# Patient Record
Sex: MALE | Race: WHITE | NOT HISPANIC OR LATINO | Employment: FULL TIME | URBAN - METROPOLITAN AREA
[De-identification: names, ages, dates, MRNs, and addresses within clinical notes are randomized per-mention and may not be internally consistent; named-entity substitution may affect disease eponyms.]

---

## 2023-03-24 PROBLEM — L23.7 POISON IVY: Status: ACTIVE | Noted: 2023-03-24

## 2023-03-24 PROBLEM — M25.552 LEFT HIP PAIN: Status: ACTIVE | Noted: 2023-03-24

## 2023-03-24 PROBLEM — K82.9 GALLBLADDER PAIN: Status: ACTIVE | Noted: 2023-03-24

## 2023-03-24 PROBLEM — M25.642 STIFFNESS OF FINGER JOINT OF LEFT HAND: Status: ACTIVE | Noted: 2023-03-24

## 2023-03-24 PROBLEM — I10 HYPERTENSION: Status: ACTIVE | Noted: 2023-03-24

## 2023-03-24 PROBLEM — R74.8 ELEVATED LIVER ENZYMES: Status: ACTIVE | Noted: 2023-03-24

## 2023-03-24 PROBLEM — M22.2X2 PATELLOFEMORAL PAIN SYNDROME OF LEFT KNEE: Status: ACTIVE | Noted: 2023-03-24

## 2023-03-24 PROBLEM — S61.211A LACERATION OF LEFT INDEX FINGER WITHOUT FOREIGN BODY WITHOUT DAMAGE TO NAIL: Status: ACTIVE | Noted: 2023-03-24

## 2023-03-24 PROBLEM — M62.838 NECK MUSCLE SPASM: Status: ACTIVE | Noted: 2023-03-24

## 2023-03-24 PROBLEM — R11.0 NAUSEA IN ADULT: Status: ACTIVE | Noted: 2023-03-24

## 2023-03-24 PROBLEM — A64 STD (MALE): Status: ACTIVE | Noted: 2023-03-24

## 2023-03-24 PROBLEM — M25.551 RIGHT HIP PAIN: Status: ACTIVE | Noted: 2023-03-24

## 2023-03-24 PROBLEM — M25.562 BILATERAL KNEE PAIN: Status: ACTIVE | Noted: 2023-03-24

## 2023-03-24 PROBLEM — S05.02XA LEFT CORNEAL ABRASION: Status: ACTIVE | Noted: 2023-03-24

## 2023-03-24 PROBLEM — G43.909 MIGRAINES: Status: ACTIVE | Noted: 2023-03-24

## 2023-03-24 PROBLEM — L02.92 BOIL: Status: ACTIVE | Noted: 2023-03-24

## 2023-03-24 PROBLEM — R05.9 COUGH: Status: ACTIVE | Noted: 2023-03-24

## 2023-03-24 PROBLEM — L30.9 DERMATITIS: Status: ACTIVE | Noted: 2023-03-24

## 2023-03-24 PROBLEM — B18.2 CHRONIC VIRAL HEPATITIS C (MULTI): Status: ACTIVE | Noted: 2023-03-24

## 2023-03-24 PROBLEM — J11.1 FLU: Status: ACTIVE | Noted: 2023-03-24

## 2023-03-24 PROBLEM — B18.1 CHRONIC HEPATITIS B (MULTI): Status: ACTIVE | Noted: 2023-03-24

## 2023-03-24 PROBLEM — R21 RASH: Status: ACTIVE | Noted: 2023-03-24

## 2023-03-24 PROBLEM — S05.90XA EYE INJURY, INITIAL ENCOUNTER: Status: ACTIVE | Noted: 2023-03-24

## 2023-03-24 PROBLEM — N20.0 KIDNEY STONES: Status: ACTIVE | Noted: 2023-03-24

## 2023-03-24 PROBLEM — N39.0 UTI (URINARY TRACT INFECTION): Status: ACTIVE | Noted: 2023-03-24

## 2023-03-24 PROBLEM — M25.561 BILATERAL KNEE PAIN: Status: ACTIVE | Noted: 2023-03-24

## 2023-03-24 PROBLEM — M79.646 FINGER PAIN: Status: ACTIVE | Noted: 2023-03-24

## 2023-03-24 PROBLEM — I25.10 CORONARY ARTERY DISEASE: Status: ACTIVE | Noted: 2023-03-24

## 2023-03-24 PROBLEM — J20.9 ACUTE BRONCHITIS: Status: ACTIVE | Noted: 2023-03-24

## 2023-03-24 PROBLEM — M79.643 HAND PAIN: Status: ACTIVE | Noted: 2023-03-24

## 2023-03-24 RX ORDER — HYDROCORTISONE 25 MG/G
CREAM TOPICAL DAILY PRN
COMMUNITY

## 2023-03-24 RX ORDER — VIT C/E/ZN/COPPR/LUTEIN/ZEAXAN 250MG-90MG
1 CAPSULE ORAL DAILY
COMMUNITY
Start: 2020-01-06 | End: 2023-04-17 | Stop reason: SDUPTHER

## 2023-03-24 RX ORDER — AMLODIPINE BESYLATE 10 MG/1
1 TABLET ORAL DAILY
COMMUNITY
Start: 2019-11-22 | End: 2023-04-17 | Stop reason: SDUPTHER

## 2023-03-24 RX ORDER — CETIRIZINE HYDROCHLORIDE 10 MG/1
10 TABLET ORAL DAILY
COMMUNITY

## 2023-03-27 RX ORDER — BUSPIRONE HYDROCHLORIDE 30 MG/1
TABLET ORAL
COMMUNITY

## 2023-03-27 RX ORDER — ASPIRIN 325 MG
TABLET, DELAYED RELEASE (ENTERIC COATED) ORAL
COMMUNITY
End: 2023-07-11 | Stop reason: SDUPTHER

## 2023-03-27 RX ORDER — AMPHETAMINE SULFATE 10 MG/1
TABLET ORAL 2 TIMES DAILY
COMMUNITY

## 2023-03-31 ENCOUNTER — APPOINTMENT (OUTPATIENT)
Dept: PRIMARY CARE | Facility: CLINIC | Age: 33
End: 2023-03-31
Payer: MEDICAID

## 2023-04-05 LAB — THYROTROPIN (MIU/L) IN SER/PLAS BY DETECTION LIMIT <= 0.05 MIU/L: 2.51 MIU/L (ref 0.44–3.98)

## 2023-04-06 ENCOUNTER — OFFICE VISIT (OUTPATIENT)
Dept: PRIMARY CARE | Facility: CLINIC | Age: 33
End: 2023-04-06
Payer: COMMERCIAL

## 2023-04-06 VITALS
DIASTOLIC BLOOD PRESSURE: 62 MMHG | HEIGHT: 70 IN | BODY MASS INDEX: 24.2 KG/M2 | WEIGHT: 169 LBS | SYSTOLIC BLOOD PRESSURE: 140 MMHG

## 2023-04-06 DIAGNOSIS — M15.3 POST-TRAUMATIC OSTEOARTHRITIS OF MULTIPLE JOINTS: ICD-10-CM

## 2023-04-06 DIAGNOSIS — E03.4 HYPOTHYROIDISM DUE TO ACQUIRED ATROPHY OF THYROID: Primary | ICD-10-CM

## 2023-04-06 PROCEDURE — 3078F DIAST BP <80 MM HG: CPT | Performed by: INTERNAL MEDICINE

## 2023-04-06 PROCEDURE — 99213 OFFICE O/P EST LOW 20 MIN: CPT | Performed by: INTERNAL MEDICINE

## 2023-04-06 PROCEDURE — 3077F SYST BP >= 140 MM HG: CPT | Performed by: INTERNAL MEDICINE

## 2023-04-06 RX ORDER — QUETIAPINE FUMARATE 50 MG/1
50 TABLET, FILM COATED ORAL NIGHTLY
COMMUNITY
Start: 2023-03-23

## 2023-04-06 RX ORDER — LEVOTHYROXINE SODIUM 75 UG/1
75 TABLET ORAL DAILY
COMMUNITY
End: 2023-05-05

## 2023-04-06 NOTE — PROGRESS NOTES
"OFFICE NOTE    NAME OF THE PATIENT: Amos Small    YOB: 1990    CHIEF COMPLAINT:  This gentleman today came here for feeling tired, fatigued, exhausted, not feeling good.  He is taking thyroid medications now.  Feeling now.  No problem.  He is seeing psychiatrist He came for follow-up.    PAST MEDICAL HISTORY:  Reviewed on EMR, unchanged.    CURRENT MEDICATIONS:  Reviewed on EMR, unchanged.  List reviewed.    ALLERGIES:  Reviewed on EMR, unchanged.    SOCIAL HISTORY:  Reviewed on EMR, unchanged.  He does not smoke.    FAMILY HISTORY:  Reviewed on EMR, unchanged.    REVIEW OF SYSTEMS:  All 12 systems reviewed and pertaining covered in history and physical.    PHYSICAL EXAMINATION  VITAL SIGNS:  As recorded and reviewed from EMR.  RESPIRATORY:  The patient had normal inspirations and expirations.  The breath sounds were equal bilaterally and clear to auscultation.  CARDIOVASCULAR:  The patient had S1 normal, split S2 without obvious rubs, clicks, or murmurs.    GASTROINTESTINAL:  There was no hepatosplenomegaly.  There were no palpable masses and no inguinal nodes.  EXTREMITIES:  Legs had no edema.  NEUROLOGIC:  The patient had normal cranial nerves.  The reflexes, sensory, and motor examination were grossly within normal limits.    LAB WORK:  Laboratory testing discussed.    ASSESSMENT AND PLAN:  Hypertension, okay.  Allergy, stable.  Anxiety and depression, okay.  Hypothyroid.  Same dose of thyroid.  Follow-up in three months with repeat tests.  Generalized body ache, muscle pain, could be fibromyalgia.  Monitor.  Follow-up appointment with me in a couple of months.  Continue to follow.    Kindly review this note in conjunction with EMR.     Subjective   Patient ID: Amos Small is a 32 y.o. male who presents for Follow-up (results).      HPI    Review of Systems    Objective   /62   Ht 1.778 m (5' 10\")   Wt 76.7 kg (169 lb)   BMI 24.25 kg/m²       Physical Exam    Assessment/Plan "   Problem List Items Addressed This Visit    None

## 2023-04-17 DIAGNOSIS — I10 PRIMARY HYPERTENSION: ICD-10-CM

## 2023-04-17 DIAGNOSIS — E55.9 VITAMIN D DEFICIENCY: ICD-10-CM

## 2023-04-17 RX ORDER — VIT C/E/ZN/COPPR/LUTEIN/ZEAXAN 250MG-90MG
25 CAPSULE ORAL DAILY
Qty: 30 CAPSULE | Refills: 0 | Status: SHIPPED | OUTPATIENT
Start: 2023-04-17 | End: 2023-07-11 | Stop reason: SDUPTHER

## 2023-04-17 RX ORDER — AMLODIPINE BESYLATE 10 MG/1
10 TABLET ORAL DAILY
Qty: 30 TABLET | Refills: 0 | Status: SHIPPED | OUTPATIENT
Start: 2023-04-17 | End: 2023-07-12

## 2023-05-05 DIAGNOSIS — I10 ESSENTIAL (PRIMARY) HYPERTENSION: ICD-10-CM

## 2023-05-05 RX ORDER — LEVOTHYROXINE SODIUM 75 UG/1
TABLET ORAL
Qty: 90 TABLET | Refills: 1 | Status: SHIPPED | OUTPATIENT
Start: 2023-05-05 | End: 2023-10-16 | Stop reason: SDUPTHER

## 2023-06-23 ENCOUNTER — APPOINTMENT (OUTPATIENT)
Dept: LAB | Facility: LAB | Age: 33
End: 2023-06-23
Payer: MEDICAID

## 2023-07-03 ENCOUNTER — APPOINTMENT (OUTPATIENT)
Dept: PRIMARY CARE | Facility: CLINIC | Age: 33
End: 2023-07-03
Payer: MEDICAID

## 2023-07-10 ENCOUNTER — LAB (OUTPATIENT)
Dept: LAB | Facility: LAB | Age: 33
End: 2023-07-10
Payer: MEDICAID

## 2023-07-10 ENCOUNTER — APPOINTMENT (OUTPATIENT)
Dept: PRIMARY CARE | Facility: CLINIC | Age: 33
End: 2023-07-10
Payer: MEDICAID

## 2023-07-10 DIAGNOSIS — M15.3 POST-TRAUMATIC OSTEOARTHRITIS OF MULTIPLE JOINTS: ICD-10-CM

## 2023-07-10 DIAGNOSIS — E03.4 HYPOTHYROIDISM DUE TO ACQUIRED ATROPHY OF THYROID: ICD-10-CM

## 2023-07-10 LAB
ALANINE AMINOTRANSFERASE (SGPT) (U/L) IN SER/PLAS: 29 U/L (ref 10–52)
ALBUMIN (G/DL) IN SER/PLAS: 5.3 G/DL (ref 3.4–5)
ALKALINE PHOSPHATASE (U/L) IN SER/PLAS: 58 U/L (ref 33–120)
ANION GAP IN SER/PLAS: 14 MMOL/L (ref 10–20)
ASPARTATE AMINOTRANSFERASE (SGOT) (U/L) IN SER/PLAS: 31 U/L (ref 9–39)
BASOPHILS (10*3/UL) IN BLOOD BY AUTOMATED COUNT: 0.04 X10E9/L (ref 0–0.1)
BASOPHILS/100 LEUKOCYTES IN BLOOD BY AUTOMATED COUNT: 0.6 % (ref 0–2)
BILIRUBIN TOTAL (MG/DL) IN SER/PLAS: 0.9 MG/DL (ref 0–1.2)
CALCIUM (MG/DL) IN SER/PLAS: 10.7 MG/DL (ref 8.6–10.6)
CARBON DIOXIDE, TOTAL (MMOL/L) IN SER/PLAS: 33 MMOL/L (ref 21–32)
CHLORIDE (MMOL/L) IN SER/PLAS: 99 MMOL/L (ref 98–107)
CREATININE (MG/DL) IN SER/PLAS: 1.02 MG/DL (ref 0.5–1.3)
EOSINOPHILS (10*3/UL) IN BLOOD BY AUTOMATED COUNT: 0.19 X10E9/L (ref 0–0.7)
EOSINOPHILS/100 LEUKOCYTES IN BLOOD BY AUTOMATED COUNT: 2.8 % (ref 0–6)
ERYTHROCYTE DISTRIBUTION WIDTH (RATIO) BY AUTOMATED COUNT: 12.1 % (ref 11.5–14.5)
ERYTHROCYTE MEAN CORPUSCULAR HEMOGLOBIN CONCENTRATION (G/DL) BY AUTOMATED: 33.5 G/DL (ref 32–36)
ERYTHROCYTE MEAN CORPUSCULAR VOLUME (FL) BY AUTOMATED COUNT: 91 FL (ref 80–100)
ERYTHROCYTES (10*6/UL) IN BLOOD BY AUTOMATED COUNT: 5.3 X10E12/L (ref 4.5–5.9)
GFR MALE: >90 ML/MIN/1.73M2
GLUCOSE (MG/DL) IN SER/PLAS: 107 MG/DL (ref 74–99)
HEMATOCRIT (%) IN BLOOD BY AUTOMATED COUNT: 48.1 % (ref 41–52)
HEMOGLOBIN (G/DL) IN BLOOD: 16.1 G/DL (ref 13.5–17.5)
IMMATURE GRANULOCYTES/100 LEUKOCYTES IN BLOOD BY AUTOMATED COUNT: 0.3 % (ref 0–0.9)
LEUKOCYTES (10*3/UL) IN BLOOD BY AUTOMATED COUNT: 6.8 X10E9/L (ref 4.4–11.3)
LYMPHOCYTES (10*3/UL) IN BLOOD BY AUTOMATED COUNT: 2.98 X10E9/L (ref 1.2–4.8)
LYMPHOCYTES/100 LEUKOCYTES IN BLOOD BY AUTOMATED COUNT: 43.6 % (ref 13–44)
MONOCYTES (10*3/UL) IN BLOOD BY AUTOMATED COUNT: 0.63 X10E9/L (ref 0.1–1)
MONOCYTES/100 LEUKOCYTES IN BLOOD BY AUTOMATED COUNT: 9.2 % (ref 2–10)
NEUTROPHILS (10*3/UL) IN BLOOD BY AUTOMATED COUNT: 2.97 X10E9/L (ref 1.2–7.7)
NEUTROPHILS/100 LEUKOCYTES IN BLOOD BY AUTOMATED COUNT: 43.5 % (ref 40–80)
NRBC (PER 100 WBCS) BY AUTOMATED COUNT: 0 /100 WBC (ref 0–0)
PLATELETS (10*3/UL) IN BLOOD AUTOMATED COUNT: 281 X10E9/L (ref 150–450)
POTASSIUM (MMOL/L) IN SER/PLAS: 4.5 MMOL/L (ref 3.5–5.3)
PROTEIN TOTAL: 8.1 G/DL (ref 6.4–8.2)
SODIUM (MMOL/L) IN SER/PLAS: 141 MMOL/L (ref 136–145)
THYROTROPIN (MIU/L) IN SER/PLAS BY DETECTION LIMIT <= 0.05 MIU/L: 2.51 MIU/L (ref 0.44–3.98)
UREA NITROGEN (MG/DL) IN SER/PLAS: 15 MG/DL (ref 6–23)

## 2023-07-10 PROCEDURE — 84443 ASSAY THYROID STIM HORMONE: CPT

## 2023-07-10 PROCEDURE — 36415 COLL VENOUS BLD VENIPUNCTURE: CPT

## 2023-07-10 PROCEDURE — 85025 COMPLETE CBC W/AUTO DIFF WBC: CPT

## 2023-07-10 PROCEDURE — 80053 COMPREHEN METABOLIC PANEL: CPT

## 2023-07-11 ENCOUNTER — OFFICE VISIT (OUTPATIENT)
Dept: PRIMARY CARE | Facility: CLINIC | Age: 33
End: 2023-07-11
Payer: COMMERCIAL

## 2023-07-11 VITALS
HEIGHT: 70 IN | BODY MASS INDEX: 23.22 KG/M2 | DIASTOLIC BLOOD PRESSURE: 76 MMHG | SYSTOLIC BLOOD PRESSURE: 124 MMHG | WEIGHT: 162.2 LBS

## 2023-07-11 DIAGNOSIS — R74.8 ELEVATED LIVER ENZYMES: ICD-10-CM

## 2023-07-11 DIAGNOSIS — I10 PRIMARY HYPERTENSION: ICD-10-CM

## 2023-07-11 DIAGNOSIS — E03.4 HYPOTHYROIDISM DUE TO ACQUIRED ATROPHY OF THYROID: ICD-10-CM

## 2023-07-11 DIAGNOSIS — E55.9 VITAMIN D DEFICIENCY: ICD-10-CM

## 2023-07-11 PROCEDURE — 3074F SYST BP LT 130 MM HG: CPT | Performed by: INTERNAL MEDICINE

## 2023-07-11 PROCEDURE — 3078F DIAST BP <80 MM HG: CPT | Performed by: INTERNAL MEDICINE

## 2023-07-11 PROCEDURE — 99214 OFFICE O/P EST MOD 30 MIN: CPT | Performed by: INTERNAL MEDICINE

## 2023-07-11 RX ORDER — VIT C/E/ZN/COPPR/LUTEIN/ZEAXAN 250MG-90MG
25 CAPSULE ORAL DAILY
Qty: 30 CAPSULE | Refills: 3 | Status: SHIPPED | OUTPATIENT
Start: 2023-07-11 | End: 2023-11-28

## 2023-07-11 NOTE — PROGRESS NOTES
OFFICE NOTE    NAME OF THE PATIENT: Amos Small    YOB: 1990    CHIEF COMPLAINT:  Amos Small today came here for multiple medical issues.   Overall, he is a happy  person.  Appetite and weight are okay.  No chest pain.  Taking medications regularly.  He is a man of good habits.  He exercises regularly.  He is drinking enough water.    PAST MEDICAL HISTORY:  Reviewed on EMR, unchanged.    CURRENT MEDICATIONS:  Reviewed on EMR, unchanged.  Diclofenac sodium gel, buspirone, omeprazole, vitamin D, ibuprofen, ______, amlodipine, levothyroxine, and amphetamine.  He sees psychiatrist.    ALLERGIES:  Reviewed on EMR, unchanged.    SOCIAL HISTORY:  Reviewed on EMR, unchanged.  He does not smoke and does not drink alcohol.    FAMILY HISTORY:  Reviewed on EMR, unchanged.    REVIEW OF SYSTEMS:  All 12 systems reviewed and pertaining covered in history and physical.    PHYSICAL EXAMINATION  VITAL SIGNS:  As recorded and reviewed from EMR.  RESPIRATORY:  The patient had normal inspirations and expirations.  The breath sounds were equal bilaterally and clear to auscultation.  CARDIOVASCULAR:  The patient had S1 normal, split S2 without obvious rubs, clicks, or murmurs.    GASTROINTESTINAL:  There was no hepatosplenomegaly.  There were no palpable masses and no inguinal nodes.  EXTREMITIES:  Legs had no edema.  NEUROLOGIC:  The patient had normal cranial nerves.  The reflexes, sensory, and motor examination were grossly within normal limits.    LAB WORK:  Laboratory testing discussed.    ASSESSMENT AND PLAN:  Hypercalcemia, borderline.  Drink enough water.  Repeat test in a month.  Osteoarthritis, on medication.  GERD, on PPI.  Anxiety and depression, on medication.  Hypothyroid, on levothyroxine.  Blood work ordered.  I urged him to see me in a month with repeat test.  I reassured him.  Calcium is borderline.  I will keep an eye.      Kindly review this note in conjunction with EMR.     Subjective   Patient ID:  "Amos Small is a 33 y.o. male who presents for Follow-up.      HPI    Review of Systems    Objective   /76   Ht 1.778 m (5' 10\")   Wt 73.6 kg (162 lb 3.2 oz)   BMI 23.27 kg/m²       Physical Exam    Assessment/Plan   Problem List Items Addressed This Visit    None  Visit Diagnoses       Vitamin D deficiency                  "

## 2023-07-12 DIAGNOSIS — I10 PRIMARY HYPERTENSION: ICD-10-CM

## 2023-07-12 RX ORDER — AMLODIPINE BESYLATE 10 MG/1
TABLET ORAL
Qty: 90 TABLET | Refills: 1 | Status: SHIPPED | OUTPATIENT
Start: 2023-07-12 | End: 2024-01-23

## 2023-08-01 DIAGNOSIS — M25.562 PAIN IN BOTH KNEES, UNSPECIFIED CHRONICITY: ICD-10-CM

## 2023-08-01 DIAGNOSIS — M25.561 PAIN IN BOTH KNEES, UNSPECIFIED CHRONICITY: ICD-10-CM

## 2023-08-01 RX ORDER — IBUPROFEN 600 MG/1
600 TABLET ORAL
Qty: 90 TABLET | Refills: 0 | Status: SHIPPED | OUTPATIENT
Start: 2023-08-01 | End: 2023-08-31

## 2023-08-01 RX ORDER — IBUPROFEN 600 MG/1
1 TABLET ORAL
COMMUNITY
Start: 2023-04-07 | End: 2023-08-01 | Stop reason: SDUPTHER

## 2023-08-09 ENCOUNTER — LAB (OUTPATIENT)
Dept: LAB | Facility: LAB | Age: 33
End: 2023-08-09
Payer: COMMERCIAL

## 2023-08-09 DIAGNOSIS — I10 PRIMARY HYPERTENSION: ICD-10-CM

## 2023-08-09 DIAGNOSIS — R74.8 ELEVATED LIVER ENZYMES: ICD-10-CM

## 2023-08-09 DIAGNOSIS — E03.4 HYPOTHYROIDISM DUE TO ACQUIRED ATROPHY OF THYROID: ICD-10-CM

## 2023-08-09 LAB
ALANINE AMINOTRANSFERASE (SGPT) (U/L) IN SER/PLAS: 25 U/L (ref 10–52)
ALBUMIN (G/DL) IN SER/PLAS: 4.9 G/DL (ref 3.4–5)
ALKALINE PHOSPHATASE (U/L) IN SER/PLAS: 51 U/L (ref 33–120)
ANION GAP IN SER/PLAS: 14 MMOL/L (ref 10–20)
ASPARTATE AMINOTRANSFERASE (SGOT) (U/L) IN SER/PLAS: 23 U/L (ref 9–39)
BILIRUBIN TOTAL (MG/DL) IN SER/PLAS: 0.7 MG/DL (ref 0–1.2)
CALCIUM (MG/DL) IN SER/PLAS: 9.9 MG/DL (ref 8.6–10.6)
CARBON DIOXIDE, TOTAL (MMOL/L) IN SER/PLAS: 30 MMOL/L (ref 21–32)
CHLORIDE (MMOL/L) IN SER/PLAS: 101 MMOL/L (ref 98–107)
CREATININE (MG/DL) IN SER/PLAS: 0.99 MG/DL (ref 0.5–1.3)
ERYTHROCYTE DISTRIBUTION WIDTH (RATIO) BY AUTOMATED COUNT: 12 % (ref 11.5–14.5)
ERYTHROCYTE MEAN CORPUSCULAR HEMOGLOBIN CONCENTRATION (G/DL) BY AUTOMATED: 33.6 G/DL (ref 32–36)
ERYTHROCYTE MEAN CORPUSCULAR VOLUME (FL) BY AUTOMATED COUNT: 92 FL (ref 80–100)
ERYTHROCYTES (10*6/UL) IN BLOOD BY AUTOMATED COUNT: 4.83 X10E12/L (ref 4.5–5.9)
GFR MALE: >90 ML/MIN/1.73M2
GLUCOSE (MG/DL) IN SER/PLAS: 93 MG/DL (ref 74–99)
HEMATOCRIT (%) IN BLOOD BY AUTOMATED COUNT: 44.3 % (ref 41–52)
HEMOGLOBIN (G/DL) IN BLOOD: 14.9 G/DL (ref 13.5–17.5)
LEUKOCYTES (10*3/UL) IN BLOOD BY AUTOMATED COUNT: 5.1 X10E9/L (ref 4.4–11.3)
NRBC (PER 100 WBCS) BY AUTOMATED COUNT: 0 /100 WBC (ref 0–0)
PARATHYRIN INTACT (PG/ML) IN SER/PLAS: 37.4 PG/ML (ref 18.5–88)
PLATELETS (10*3/UL) IN BLOOD AUTOMATED COUNT: 253 X10E9/L (ref 150–450)
POC CALCIUM IONIZED (MMOL/L) IN BLOOD: 1.23 MMOL/L (ref 1.1–1.33)
POTASSIUM (MMOL/L) IN SER/PLAS: 4.1 MMOL/L (ref 3.5–5.3)
PROTEIN TOTAL: 7.5 G/DL (ref 6.4–8.2)
SODIUM (MMOL/L) IN SER/PLAS: 141 MMOL/L (ref 136–145)
THYROTROPIN (MIU/L) IN SER/PLAS BY DETECTION LIMIT <= 0.05 MIU/L: 1.64 MIU/L (ref 0.44–3.98)
UREA NITROGEN (MG/DL) IN SER/PLAS: 20 MG/DL (ref 6–23)

## 2023-08-09 PROCEDURE — 80053 COMPREHEN METABOLIC PANEL: CPT

## 2023-08-09 PROCEDURE — 85027 COMPLETE CBC AUTOMATED: CPT

## 2023-08-09 PROCEDURE — 83970 ASSAY OF PARATHORMONE: CPT

## 2023-08-09 PROCEDURE — 36415 COLL VENOUS BLD VENIPUNCTURE: CPT

## 2023-08-09 PROCEDURE — 84443 ASSAY THYROID STIM HORMONE: CPT

## 2023-08-09 PROCEDURE — 82330 ASSAY OF CALCIUM: CPT

## 2023-08-11 ENCOUNTER — OFFICE VISIT (OUTPATIENT)
Dept: PRIMARY CARE | Facility: CLINIC | Age: 33
End: 2023-08-11
Payer: COMMERCIAL

## 2023-08-11 VITALS
BODY MASS INDEX: 22.76 KG/M2 | HEIGHT: 70 IN | SYSTOLIC BLOOD PRESSURE: 118 MMHG | DIASTOLIC BLOOD PRESSURE: 72 MMHG | WEIGHT: 159 LBS

## 2023-08-11 DIAGNOSIS — M54.2 NECK PAIN: Primary | ICD-10-CM

## 2023-08-11 PROBLEM — F90.2 ATTENTION-DEFICIT HYPERACTIVITY DISORDER, COMBINED TYPE: Status: ACTIVE | Noted: 2021-06-16

## 2023-08-11 PROBLEM — F41.1 GAD (GENERALIZED ANXIETY DISORDER): Status: ACTIVE | Noted: 2021-06-16

## 2023-08-11 PROBLEM — F12.10 CANNABIS ABUSE: Status: ACTIVE | Noted: 2021-06-16

## 2023-08-11 PROCEDURE — 99213 OFFICE O/P EST LOW 20 MIN: CPT | Performed by: INTERNAL MEDICINE

## 2023-08-11 PROCEDURE — 3074F SYST BP LT 130 MM HG: CPT | Performed by: INTERNAL MEDICINE

## 2023-08-11 PROCEDURE — 3078F DIAST BP <80 MM HG: CPT | Performed by: INTERNAL MEDICINE

## 2023-08-11 ASSESSMENT — ENCOUNTER SYMPTOMS
LOSS OF SENSATION IN FEET: 0
DEPRESSION: 0
OCCASIONAL FEELINGS OF UNSTEADINESS: 0

## 2023-08-11 NOTE — PROGRESS NOTES
"OFFICE NOTE    NAME OF THE PATIENT: Amos Small    YOB: 1990    CHIEF COMPLAINT:  Mr. Amos Small today came here for multiple medical issues.  Overall, he is a happy person.  Psychiatrist is tapering down the drug.  Appetite and weight are okay.  No problem.  He came here for follow-up.    PAST MEDICAL HISTORY:  Reviewed on EMR, unchanged.    CURRENT MEDICATIONS:  Reviewed on EMR, unchanged.  List reviewed.    ALLERGIES:  Reviewed on EMR, unchanged.    SOCIAL HISTORY:  Reviewed on EMR, unchanged.  He does not smoke and does not drink alcohol.    FAMILY HISTORY:  Reviewed on EMR, unchanged.    REVIEW OF SYSTEMS:  All 12 systems reviewed and pertaining covered in history and physical.    PHYSICAL EXAMINATION  VITAL SIGNS:  As recorded and reviewed from EMR.  RESPIRATORY:  The patient had normal inspirations and expirations.  The breath sounds were equal bilaterally and clear to auscultation.  CARDIOVASCULAR:  The patient had S1 normal, split S2 without obvious rubs, clicks, or murmurs.    GASTROINTESTINAL:  There was no hepatosplenomegaly.  There were no palpable masses and no inguinal nodes.  EXTREMITIES:  Legs had no edema.  NEUROLOGIC:  The patient had normal cranial nerves.  The reflexes, sensory, and motor examination were grossly within normal limits.    LAB WORK:  Laboratory testing discussed.    ASSESSMENT AND PLAN:  Hypercalcemia, improved.  Current ______ okay.  Hypothyroid.  We will monitor thyroid.  Anxiety and stress, stable.  Follow-up appointment with me in a couple of months.  Continue to follow.      Kindly review this note in conjunction with EMR.     Subjective   Patient ID: Amos Small is a 33 y.o. male who presents for Follow-up.      HPI    Review of Systems    Objective   /72   Ht 1.778 m (5' 10\")   Wt 72.1 kg (159 lb)   BMI 22.81 kg/m²       Physical Exam    Assessment/Plan   Problem List Items Addressed This Visit    None        "

## 2023-08-29 DIAGNOSIS — M25.561 PAIN IN BOTH KNEES, UNSPECIFIED CHRONICITY: ICD-10-CM

## 2023-08-29 DIAGNOSIS — M25.562 PAIN IN BOTH KNEES, UNSPECIFIED CHRONICITY: ICD-10-CM

## 2023-08-29 RX ORDER — DICLOFENAC SODIUM 10 MG/G
GEL TOPICAL
Qty: 100 G | Refills: 2 | Status: SHIPPED | OUTPATIENT
Start: 2023-08-29 | End: 2024-01-15

## 2023-09-12 ENCOUNTER — OFFICE VISIT (OUTPATIENT)
Dept: PRIMARY CARE | Facility: CLINIC | Age: 33
End: 2023-09-12
Payer: COMMERCIAL

## 2023-09-12 ENCOUNTER — LAB (OUTPATIENT)
Dept: LAB | Facility: LAB | Age: 33
End: 2023-09-12
Payer: COMMERCIAL

## 2023-09-12 VITALS
HEIGHT: 70 IN | WEIGHT: 164 LBS | SYSTOLIC BLOOD PRESSURE: 122 MMHG | DIASTOLIC BLOOD PRESSURE: 82 MMHG | BODY MASS INDEX: 23.48 KG/M2

## 2023-09-12 DIAGNOSIS — F41.9 ANXIETY: ICD-10-CM

## 2023-09-12 DIAGNOSIS — I10 PRIMARY HYPERTENSION: ICD-10-CM

## 2023-09-12 DIAGNOSIS — R06.02 SOB (SHORTNESS OF BREATH): ICD-10-CM

## 2023-09-12 DIAGNOSIS — R06.02 SOB (SHORTNESS OF BREATH): Primary | ICD-10-CM

## 2023-09-12 DIAGNOSIS — Z86.2 HISTORY OF ANEMIA: ICD-10-CM

## 2023-09-12 DIAGNOSIS — E03.9 HYPOTHYROIDISM, UNSPECIFIED TYPE: ICD-10-CM

## 2023-09-12 LAB
ALANINE AMINOTRANSFERASE (SGPT) (U/L) IN SER/PLAS: 51 U/L (ref 10–52)
ALBUMIN (G/DL) IN SER/PLAS: 5 G/DL (ref 3.4–5)
ALKALINE PHOSPHATASE (U/L) IN SER/PLAS: 51 U/L (ref 33–120)
AMMONIA (UMOL/L) IN PLASMA: 36 UMOL/L
ANION GAP IN SER/PLAS: 15 MMOL/L (ref 10–20)
APPEARANCE, URINE: NORMAL
ASPARTATE AMINOTRANSFERASE (SGOT) (U/L) IN SER/PLAS: 35 U/L (ref 9–39)
BASOPHILS (10*3/UL) IN BLOOD BY AUTOMATED COUNT: 0.05 X10E9/L (ref 0–0.1)
BASOPHILS/100 LEUKOCYTES IN BLOOD BY AUTOMATED COUNT: 0.9 % (ref 0–2)
BILIRUBIN TOTAL (MG/DL) IN SER/PLAS: 0.7 MG/DL (ref 0–1.2)
BILIRUBIN, URINE: NEGATIVE
BLOOD, URINE: NEGATIVE
CALCIUM (MG/DL) IN SER/PLAS: 10.8 MG/DL (ref 8.6–10.6)
CARBON DIOXIDE, TOTAL (MMOL/L) IN SER/PLAS: 33 MMOL/L (ref 21–32)
CHLORIDE (MMOL/L) IN SER/PLAS: 98 MMOL/L (ref 98–107)
COLOR, URINE: NORMAL
CREATININE (MG/DL) IN SER/PLAS: 0.97 MG/DL (ref 0.5–1.3)
EOSINOPHILS (10*3/UL) IN BLOOD BY AUTOMATED COUNT: 0.17 X10E9/L (ref 0–0.7)
EOSINOPHILS/100 LEUKOCYTES IN BLOOD BY AUTOMATED COUNT: 3.2 % (ref 0–6)
ERYTHROCYTE DISTRIBUTION WIDTH (RATIO) BY AUTOMATED COUNT: 12.2 % (ref 11.5–14.5)
ERYTHROCYTE MEAN CORPUSCULAR HEMOGLOBIN CONCENTRATION (G/DL) BY AUTOMATED: 33 G/DL (ref 32–36)
ERYTHROCYTE MEAN CORPUSCULAR VOLUME (FL) BY AUTOMATED COUNT: 94 FL (ref 80–100)
ERYTHROCYTES (10*6/UL) IN BLOOD BY AUTOMATED COUNT: 5 X10E12/L (ref 4.5–5.9)
GFR MALE: >90 ML/MIN/1.73M2
GLUCOSE (MG/DL) IN SER/PLAS: 91 MG/DL (ref 74–99)
GLUCOSE, URINE: NEGATIVE MG/DL
HEMATOCRIT (%) IN BLOOD BY AUTOMATED COUNT: 46.9 % (ref 41–52)
HEMOGLOBIN (G/DL) IN BLOOD: 15.5 G/DL (ref 13.5–17.5)
IMMATURE GRANULOCYTES/100 LEUKOCYTES IN BLOOD BY AUTOMATED COUNT: 0.2 % (ref 0–0.9)
KETONES, URINE: NEGATIVE MG/DL
LEUKOCYTE ESTERASE, URINE: NEGATIVE
LEUKOCYTES (10*3/UL) IN BLOOD BY AUTOMATED COUNT: 5.3 X10E9/L (ref 4.4–11.3)
LYMPHOCYTES (10*3/UL) IN BLOOD BY AUTOMATED COUNT: 1.97 X10E9/L (ref 1.2–4.8)
LYMPHOCYTES/100 LEUKOCYTES IN BLOOD BY AUTOMATED COUNT: 37.2 % (ref 13–44)
MONOCYTES (10*3/UL) IN BLOOD BY AUTOMATED COUNT: 0.54 X10E9/L (ref 0.1–1)
MONOCYTES/100 LEUKOCYTES IN BLOOD BY AUTOMATED COUNT: 10.2 % (ref 2–10)
NEUTROPHILS (10*3/UL) IN BLOOD BY AUTOMATED COUNT: 2.56 X10E9/L (ref 1.2–7.7)
NEUTROPHILS/100 LEUKOCYTES IN BLOOD BY AUTOMATED COUNT: 48.3 % (ref 40–80)
NITRITE, URINE: NEGATIVE
NRBC (PER 100 WBCS) BY AUTOMATED COUNT: 0 /100 WBC (ref 0–0)
PH, URINE: 7 (ref 5–8)
PLATELETS (10*3/UL) IN BLOOD AUTOMATED COUNT: 207 X10E9/L (ref 150–450)
POTASSIUM (MMOL/L) IN SER/PLAS: 4.5 MMOL/L (ref 3.5–5.3)
PROTEIN TOTAL: 7.9 G/DL (ref 6.4–8.2)
PROTEIN, URINE: NEGATIVE MG/DL
SODIUM (MMOL/L) IN SER/PLAS: 141 MMOL/L (ref 136–145)
SPECIFIC GRAVITY, URINE: 1.01 (ref 1–1.03)
THYROTROPIN (MIU/L) IN SER/PLAS BY DETECTION LIMIT <= 0.05 MIU/L: 3.93 MIU/L (ref 0.44–3.98)
UREA NITROGEN (MG/DL) IN SER/PLAS: 14 MG/DL (ref 6–23)
UROBILINOGEN, URINE: <2 MG/DL (ref 0–1.9)

## 2023-09-12 PROCEDURE — 85025 COMPLETE CBC W/AUTO DIFF WBC: CPT

## 2023-09-12 PROCEDURE — 99214 OFFICE O/P EST MOD 30 MIN: CPT | Performed by: INTERNAL MEDICINE

## 2023-09-12 PROCEDURE — 81003 URINALYSIS AUTO W/O SCOPE: CPT

## 2023-09-12 PROCEDURE — 3074F SYST BP LT 130 MM HG: CPT | Performed by: INTERNAL MEDICINE

## 2023-09-12 PROCEDURE — 82140 ASSAY OF AMMONIA: CPT

## 2023-09-12 PROCEDURE — 36415 COLL VENOUS BLD VENIPUNCTURE: CPT

## 2023-09-12 PROCEDURE — 80053 COMPREHEN METABOLIC PANEL: CPT

## 2023-09-12 PROCEDURE — 3079F DIAST BP 80-89 MM HG: CPT | Performed by: INTERNAL MEDICINE

## 2023-09-12 PROCEDURE — 84443 ASSAY THYROID STIM HORMONE: CPT

## 2023-09-12 ASSESSMENT — ENCOUNTER SYMPTOMS
DEPRESSION: 0
LOSS OF SENSATION IN FEET: 0
OCCASIONAL FEELINGS OF UNSTEADINESS: 0

## 2023-09-12 NOTE — PROGRESS NOTES
"Subjective   Patient ID: Amos Small is a 33 y.o. male who presents for Follow-up.    HPI   This 33-year-old white gentleman today came here for multiple medical issues.  He is not feeling good.  He gets shortness of breath easily.  He thinks that he does not get enough oxygen.  At times he becomes hungry and short of breath.  He is worried about disease like asthma.  Allergy is bothering him.  He thinks this could be side effect of his medicines.  He came here for follow-up.    IMMUNIZATION:  Tetanus within the last 10 years.    I have personally reviewed the patient’s Past Medical History, Medications, Allergies, Social History, and Family History in the EMR.    Review of Systems  The patient has never had a heart attack.  No stroke.  No diabetes.  No cancer. No childhood history of asthma, but some allergies.   All 12 systems reviewed and pertaining covered in history and physical.    Objective   /82   Ht 1.778 m (5' 10\")   Wt 74.4 kg (164 lb)   BMI 23.53 kg/m²     Physical Exam  EYES:  The patient’s sclerae white.  The pupils were equal and round.  ENT:  The patient’s external ears were normal and the otoscopic examination was negative.  NECK:  Supple.  There were no palpable masses.  Thyroid was not enlarged and there were no carotid bruits.  RESPIRATORY:  Minimal wheezing.  Basal crepitation.  CARDIOVASCULAR:  The patient had S1 normal, split S2 without obvious rubs, clicks, or murmurs.  GASTROINTESTINAL:  There was no hepatosplenomegaly.  There were no palpable masses and no inguinal nodes.  LYMPHATIC:  The patient had no axillary, groin, or lymphadenopathy.  MUSCULOSKELETAL:  The patient had normal gait.  The joints appeared to be normal without evidence of deformity.  Grossly the muscles had good range of motion and were without effusion.  EXTREMITIES:  There was no evidence of peripheral edema.  NEUROLOGIC:  The patient had normal cranial nerves.  The reflexes, sensory, and motor examination were " grossly within normal limits.  PSYCHIATRIC:  The patient had normal judgment and insight.  The patient was oriented to person, place, and time, and had no obvious mood defect including depression, anxiety, and/or agitation.    LAB WORK:  Laboratory testing discussed.    Assessment/Plan     1. Shortness of breath and wheezing.  I will do a chest x-ray and lung function test.  2. History of anemia.  Blood work ordered.  3. Hypertension, on medication.  Check kidney function.  4. Cholesterol.  Monitor.  5. Monitoring of anxiety medications.  Timings are good.  He sees psychiatrist.  6. I urged him to see me in a week after the blood test.  Reassured him.  7. I shall see him in a week.  8. Continue to follow.    Scribe Attestation  By signing my name below, IOneyda Scribe   attest that this documentation has been prepared under the direction and in the presence of Rambo Mccain MD.

## 2023-10-16 ENCOUNTER — OFFICE VISIT (OUTPATIENT)
Dept: PRIMARY CARE | Facility: CLINIC | Age: 33
End: 2023-10-16
Payer: COMMERCIAL

## 2023-10-16 VITALS
DIASTOLIC BLOOD PRESSURE: 72 MMHG | HEIGHT: 70 IN | BODY MASS INDEX: 24.48 KG/M2 | WEIGHT: 171 LBS | SYSTOLIC BLOOD PRESSURE: 140 MMHG

## 2023-10-16 DIAGNOSIS — R09.82 PND (POST-NASAL DRIP): ICD-10-CM

## 2023-10-16 DIAGNOSIS — F41.9 ANXIETY AND DEPRESSION: ICD-10-CM

## 2023-10-16 DIAGNOSIS — R06.83 SNORING: ICD-10-CM

## 2023-10-16 DIAGNOSIS — E78.00 HIGH CHOLESTEROL: ICD-10-CM

## 2023-10-16 DIAGNOSIS — J20.9 ACUTE BRONCHITIS, UNSPECIFIED ORGANISM: ICD-10-CM

## 2023-10-16 DIAGNOSIS — J30.9 ALLERGIC RHINITIS, UNSPECIFIED SEASONALITY, UNSPECIFIED TRIGGER: Primary | ICD-10-CM

## 2023-10-16 DIAGNOSIS — F32.A ANXIETY AND DEPRESSION: ICD-10-CM

## 2023-10-16 DIAGNOSIS — I10 ESSENTIAL (PRIMARY) HYPERTENSION: ICD-10-CM

## 2023-10-16 PROCEDURE — 3077F SYST BP >= 140 MM HG: CPT | Performed by: INTERNAL MEDICINE

## 2023-10-16 PROCEDURE — 3078F DIAST BP <80 MM HG: CPT | Performed by: INTERNAL MEDICINE

## 2023-10-16 PROCEDURE — 99214 OFFICE O/P EST MOD 30 MIN: CPT | Performed by: INTERNAL MEDICINE

## 2023-10-16 RX ORDER — FLUTICASONE PROPIONATE 50 MCG
1 SPRAY, SUSPENSION (ML) NASAL DAILY
Qty: 16 G | Refills: 6 | Status: SHIPPED | OUTPATIENT
Start: 2023-10-16 | End: 2023-11-07

## 2023-10-16 RX ORDER — MONTELUKAST SODIUM 10 MG/1
10 TABLET ORAL NIGHTLY
Qty: 30 TABLET | Refills: 0 | Status: SHIPPED | OUTPATIENT
Start: 2023-10-16 | End: 2023-11-07

## 2023-10-16 RX ORDER — ALBUTEROL SULFATE 90 UG/1
2 AEROSOL, METERED RESPIRATORY (INHALATION) EVERY 4 HOURS PRN
Qty: 8 G | Refills: 2 | Status: SHIPPED | OUTPATIENT
Start: 2023-10-16 | End: 2024-10-15

## 2023-10-16 RX ORDER — LEVOTHYROXINE SODIUM 75 UG/1
75 TABLET ORAL DAILY
Qty: 90 TABLET | Refills: 1 | Status: SHIPPED | OUTPATIENT
Start: 2023-10-16 | End: 2024-04-17

## 2023-10-16 RX ORDER — LORATADINE 10 MG/1
10 TABLET ORAL DAILY
Qty: 30 TABLET | Refills: 6 | Status: SHIPPED | OUTPATIENT
Start: 2023-10-16 | End: 2023-11-07

## 2023-10-16 ASSESSMENT — ENCOUNTER SYMPTOMS
OCCASIONAL FEELINGS OF UNSTEADINESS: 0
LOSS OF SENSATION IN FEET: 0
DEPRESSION: 0

## 2023-10-16 NOTE — PROGRESS NOTES
"Subjective   Patient ID: Amos Small is a 33 y.o. male who presents for Follow-up (Multiple medical issues).    Mr. Small is very frustrated.  He has sinus congestion, postnasal drip.  Both ears are blocked.  He wakes up at night.  He does not think he snores. Feeling tired, fatigued, and exhausted.  Recurrent problem.  Liver function test done.    I have personally reviewed the patient's Past Medical History, Medications, Allergies, Social History, and Family History in the EMR.    Review of Systems   All other systems reviewed and are negative.    Objective   /72   Ht 1.778 m (5' 10\")   Wt 77.6 kg (171 lb)   BMI 24.54 kg/m²     Physical Exam  Vitals reviewed.   HENT:      Nose: Congestion present.      Comments: Postnasal drip.     Mouth/Throat:      Comments: Throat congested.  Cardiovascular:      Heart sounds: Normal heart sounds, S1 normal and S2 normal. No murmur heard.     No friction rub.   Pulmonary:      Effort: Pulmonary effort is normal.      Breath sounds: Normal breath sounds and air entry.   Abdominal:      Palpations: There is no hepatomegaly, splenomegaly or mass.   Musculoskeletal:      Right lower leg: No edema.      Left lower leg: No edema.   Lymphadenopathy:      Lower Body: No right inguinal adenopathy. No left inguinal adenopathy.   Neurological:      Cranial Nerves: Cranial nerves 2-12 are intact.      Sensory: No sensory deficit.      Motor: Motor function is intact.      Deep Tendon Reflexes: Reflexes are normal and symmetric.     LAB WORK: Laboratory testing discussed.    Assessment/Plan   Problem List Items Addressed This Visit             ICD-10-CM       Pulmonary and Pneumonias    Acute bronchitis J20.9    Relevant Medications    albuterol (Proventil HFA) 90 mcg/actuation inhaler    fluticasone (Flonase) 50 mcg/actuation nasal spray    loratadine (Claritin) 10 mg tablet    montelukast (Singulair) 10 mg tablet     Other Visit Diagnoses         Codes    Allergic rhinitis, " unspecified seasonality, unspecified trigger    -  Primary J30.9    Essential (primary) hypertension     I10    Relevant Medications    levothyroxine (Synthroid, Levoxyl) 75 mcg tablet    High cholesterol     E78.00    Anxiety and depression     F41.9, F32.A        1. Allergic rhinitis, postnasal drip and congestion.  Flonase, Claritin, Singulair.  2. ______, possible snoring.  Sleep apnea.  He lives by himself.  Home sleep test ordered.  3. Lung function test normal.  4. Hypertension, okay.  5. High cholesterol, stable.  6. Anxiety and depression, on medication.  7. Follow up in a couple of weeks after medication.  8. I will continue to monitor.    Scribe Attestation  By signing my name below, I, Mary Wylie attest that this documentation has been prepared under the direction and in the presence of Rambo Mccain MD.

## 2023-10-17 ENCOUNTER — PHARMACY VISIT (OUTPATIENT)
Dept: PHARMACY | Facility: CLINIC | Age: 33
End: 2023-10-17
Payer: COMMERCIAL

## 2023-10-17 ENCOUNTER — HOSPITAL ENCOUNTER (EMERGENCY)
Facility: HOSPITAL | Age: 33
Discharge: LEFT BEFORE DISCHARGE | End: 2023-10-17
Payer: COMMERCIAL

## 2023-10-17 ENCOUNTER — OFFICE VISIT (OUTPATIENT)
Dept: PRIMARY CARE | Facility: CLINIC | Age: 33
End: 2023-10-17
Payer: COMMERCIAL

## 2023-10-17 VITALS
RESPIRATION RATE: 20 BRPM | HEIGHT: 70 IN | WEIGHT: 159.17 LBS | SYSTOLIC BLOOD PRESSURE: 152 MMHG | BODY MASS INDEX: 22.79 KG/M2 | HEART RATE: 98 BPM | TEMPERATURE: 98.2 F | DIASTOLIC BLOOD PRESSURE: 84 MMHG | OXYGEN SATURATION: 98 %

## 2023-10-17 DIAGNOSIS — F41.9 ANXIETY: Primary | ICD-10-CM

## 2023-10-17 PROBLEM — I10 ESSENTIAL (PRIMARY) HYPERTENSION: Status: ACTIVE | Noted: 2023-10-17

## 2023-10-17 PROBLEM — R06.83 SNORING: Status: ACTIVE | Noted: 2023-10-17

## 2023-10-17 PROBLEM — J30.9 ALLERGIC RHINITIS: Status: ACTIVE | Noted: 2023-10-17

## 2023-10-17 PROCEDURE — 4500999001 HC ED NO CHARGE

## 2023-10-17 PROCEDURE — 99213 OFFICE O/P EST LOW 20 MIN: CPT | Performed by: INTERNAL MEDICINE

## 2023-10-17 PROCEDURE — 99283 EMERGENCY DEPT VISIT LOW MDM: CPT

## 2023-10-17 PROCEDURE — RXMED WILLOW AMBULATORY MEDICATION CHARGE

## 2023-10-17 RX ORDER — HYDROXYZINE PAMOATE 25 MG/1
25 CAPSULE ORAL ONCE
Qty: 1 CAPSULE | Refills: 0 | Status: SHIPPED | OUTPATIENT
Start: 2023-10-17 | End: 2023-10-17

## 2023-10-17 RX ORDER — HYDROXYZINE PAMOATE 25 MG/1
25 CAPSULE ORAL 3 TIMES DAILY PRN
Qty: 30 CAPSULE | Refills: 0 | Status: SHIPPED | OUTPATIENT
Start: 2023-10-17 | End: 2023-10-27

## 2023-10-17 ASSESSMENT — PAIN DESCRIPTION - PAIN TYPE: TYPE: CHRONIC PAIN

## 2023-10-17 ASSESSMENT — PAIN DESCRIPTION - DESCRIPTORS: DESCRIPTORS: ACHING;SHARP

## 2023-10-17 ASSESSMENT — PAIN DESCRIPTION - ONSET: ONSET: ONGOING

## 2023-10-17 ASSESSMENT — PAIN DESCRIPTION - LOCATION: LOCATION: NECK

## 2023-10-17 ASSESSMENT — PAIN - FUNCTIONAL ASSESSMENT: PAIN_FUNCTIONAL_ASSESSMENT: 0-10

## 2023-10-17 ASSESSMENT — PAIN DESCRIPTION - FREQUENCY: FREQUENCY: CONSTANT/CONTINUOUS

## 2023-10-17 ASSESSMENT — PAIN SCALES - GENERAL: PAINLEVEL_OUTOF10: 9

## 2023-10-18 NOTE — PROGRESS NOTES
Subjective   Patient ID: Amos Small is a 33 y.o. male who presents for Follow-up.    Mr. Small today came here for follow-up plus ______.  He told me he is not feeling good.  He took Singulair last night, he had reaction.  He is getting anxiety and stress.  He is not feeling good.  He came for follow-up.    I have personally reviewed the patient's Past Medical History, Medications, Allergies, Social History, and Family History in the EMR.    Review of Systems   All other systems reviewed and are negative.    Objective   There were no vitals taken for this visit.    Physical Exam  Vitals reviewed.   Cardiovascular:      Heart sounds: Normal heart sounds, S1 normal and S2 normal. No murmur heard.     No friction rub.   Pulmonary:      Effort: Pulmonary effort is normal.      Breath sounds: Normal breath sounds and air entry.   Abdominal:      Palpations: There is no hepatomegaly, splenomegaly or mass.   Musculoskeletal:      Right lower leg: No edema.      Left lower leg: No edema.   Lymphadenopathy:      Lower Body: No right inguinal adenopathy. No left inguinal adenopathy.   Neurological:      Cranial Nerves: Cranial nerves 2-12 are intact.      Sensory: No sensory deficit.      Motor: Motor function is intact.      Deep Tendon Reflexes: Reflexes are normal and symmetric.     LAB WORK: Laboratory testing discussed.    Assessment/Plan   Problem List Items Addressed This Visit             ICD-10-CM       Mental Health    Anxiety - Primary F41.9    Relevant Medications    hydrOXYzine pamoate (VistariL) 25 mg capsule   1. Side effects from Singulair, I really doubt it.  ______.  2. Anxiety and stress.  The patient has acute attack of psychosis.  We gave him Benadryl, it did not work.  We observed for 20 minutes.  He was very psychotic, diaphoretic.  911 called.  The patient was sent to psychiatric care.  Medically stable.  Doing okay.  3. I spoke to the EMS.  Time spent explaining, reassured the patient multiple times,  multiple times the patient reviewed.  I reassured him.  He will go under psychiatric care.  I observed him well over 25 minutes, with the help of my assistants.  4. I shall see him after he is released from psychiatric hospital.    Scribe Attestation  By signing my name below, I, Alie Gonzales, Mary attest that this documentation has been prepared under the direction and in the presence of Rambo Mccain MD.

## 2023-11-01 ENCOUNTER — OFFICE VISIT (OUTPATIENT)
Dept: UROLOGY | Facility: CLINIC | Age: 33
End: 2023-11-01
Payer: COMMERCIAL

## 2023-11-01 VITALS — WEIGHT: 160 LBS | BODY MASS INDEX: 22.9 KG/M2 | HEIGHT: 70 IN | TEMPERATURE: 97.8 F

## 2023-11-01 DIAGNOSIS — N52.9 ERECTILE DYSFUNCTION, UNSPECIFIED ERECTILE DYSFUNCTION TYPE: Primary | ICD-10-CM

## 2023-11-01 PROCEDURE — 99203 OFFICE O/P NEW LOW 30 MIN: CPT | Performed by: NURSE PRACTITIONER

## 2023-11-01 PROCEDURE — 3078F DIAST BP <80 MM HG: CPT | Performed by: NURSE PRACTITIONER

## 2023-11-01 PROCEDURE — 3077F SYST BP >= 140 MM HG: CPT | Performed by: NURSE PRACTITIONER

## 2023-11-01 PROCEDURE — 1036F TOBACCO NON-USER: CPT | Performed by: NURSE PRACTITIONER

## 2023-11-01 ASSESSMENT — ENCOUNTER SYMPTOMS
LOSS OF SENSATION IN FEET: 0
OCCASIONAL FEELINGS OF UNSTEADINESS: 0

## 2023-11-01 ASSESSMENT — COLUMBIA-SUICIDE SEVERITY RATING SCALE - C-SSRS
6. HAVE YOU EVER DONE ANYTHING, STARTED TO DO ANYTHING, OR PREPARED TO DO ANYTHING TO END YOUR LIFE?: NO
2. HAVE YOU ACTUALLY HAD ANY THOUGHTS OF KILLING YOURSELF?: NO
1. IN THE PAST MONTH, HAVE YOU WISHED YOU WERE DEAD OR WISHED YOU COULD GO TO SLEEP AND NOT WAKE UP?: NO

## 2023-11-01 ASSESSMENT — PATIENT HEALTH QUESTIONNAIRE - PHQ9
SUM OF ALL RESPONSES TO PHQ9 QUESTIONS 1 AND 2: 0
2. FEELING DOWN, DEPRESSED OR HOPELESS: NOT AT ALL
1. LITTLE INTEREST OR PLEASURE IN DOING THINGS: NOT AT ALL

## 2023-11-01 ASSESSMENT — PAIN SCALES - GENERAL: PAINLEVEL: 0-NO PAIN

## 2023-11-01 NOTE — PROGRESS NOTES
[facility]  Outpatient Clinic Note      Patient: Amos Small  Age/Sex: 33 y.o., male  MRN: [id number]      History of Present Illness  33-year-old gentleman presents as new patient for evaluation of ED. He has a history of anxiety and hypothyroid. He notes difficulties obtaining erections his entire life despite strong libido. He has been in 2 relationships where this has not been a problem. He notes strong morning erections every morning. No problems with ejaculation. He has no other complaints.     Past Medical & Surgical History  Past Medical History:   Diagnosis Date    ADD (attention deficit disorder)     Anxiety     Depression     GERD (gastroesophageal reflux disease)     Hypertension     Hypothyroidism       Past Surgical History:   Procedure Laterality Date    CARDIAC SURGERY  09/30/2013    Heart Surgery          Labs  None recent    Medications:  Current Outpatient Medications on File Prior to Visit   Medication Sig Dispense Refill    albuterol (Proventil HFA) 90 mcg/actuation inhaler Inhale 2 puffs every 4 hours if needed for wheezing or shortness of breath. 8 g 2    amLODIPine (Norvasc) 10 mg tablet TAKE 1 TABLET BY MOUTH EVERY DAY AS DIRECTED 90 tablet 1    amphetamine sulfate 10 mg tablet Take by mouth twice a day.      busPIRone (Buspar) 30 mg tablet Take by mouth.      cetirizine (ZyrTEC) 10 mg tablet Take 1 tablet (10 mg) by mouth once daily.      cholecalciferol (Vitamin D-3) 25 MCG (1000 UT) capsule Take 1 capsule (25 mcg) by mouth once daily. 30 capsule 3    diclofenac sodium (Voltaren) 1 % gel gel APPLY 1 APPLICATION TOPICALLY ONCE DAILY. APPLY SPARINGLY TO AFFECTED AREA 100 g 2    fluticasone (Flonase) 50 mcg/actuation nasal spray Administer 1 spray into each nostril once daily. Shake gently. Before first use, prime pump. After use, clean tip and replace cap. 16 g 6    hydrocortisone 2.5 % cream Apply topically once daily as needed. Apply once      hydrOXYzine pamoate (VistariL) 25 mg  capsule Take 1 capsule (25 mg) by mouth 3 times a day as needed for itching for up to 10 days. 30 capsule 0    levothyroxine (Synthroid, Levoxyl) 75 mcg tablet Take 1 tablet (75 mcg) by mouth once daily. 90 tablet 1    loratadine (Claritin) 10 mg tablet Take 1 tablet (10 mg) by mouth once daily. 30 tablet 6    montelukast (Singulair) 10 mg tablet Take 1 tablet (10 mg) by mouth once daily at bedtime. 30 tablet 0    QUEtiapine (SEROquel) 50 mg tablet Take 1 tablet (50 mg) by mouth once daily at bedtime.       No current facility-administered medications on file prior to visit.          Physical Exam                                                                                                                      General: Well developed, well nourished, alert and cooperative, appears in no acute distress  Eyes: Non-injected conjunctiva, sclera clear, no proptosis  Cardiac: Extremities are warm and well perfused. No edema, cyanosis or pallor.   Lungs: Breathing is easy, non-labored. Speaking in clear and complete sentences. Normal diaphragmatic movement.  MSK: Ambulatory with steady gait, unassisted  Neuro: alert and oriented to person, place and time  Psych: Demonstrates good judgement and reason, without hallucinations, abnormal affect or abnormal behaviors.  Skin: no obvious lesions, no rashes      Review of Systems  Review of Systems   All other systems reviewed and are negative.         Imaging  NA      Assessment & Plan  33-year-old gentleman presents as new patient for evaluation of ED. He has a history of anxiety and hypothyroid. He notes difficulties obtaining erections his entire life despite strong libido. He has been in 2 relationships where this has not been a problem. He notes strong morning erections every morning. No problems with ejaculation. He has no other complaints.     Patient was seen today with the complaint of erectile dysfunction (ED). I went over the definition of ED, which is the inability  to obtain or maintain an erection sufficient for satisfactory sexual activity. I outlined that erectile function is a complex interplay of neural, vascular, hormonal and psychological factors. Disruption in any of these pathways may lead to ED. In terms of treatment options; PDE5i (Viagra, Cialis, etc.), intracavernosal injections (ICI), vacuum erection devices (MISHEL) and penile implants were discussed in detail.      Will obtain ED labs. I am unsure if this is urologic in nature, given reported normal AM erections. He might benefit from seeing Dr. Saha, as well. Recommend following with PCP to rule out underlying cardiac issue given his complaints at young age.    Will refer to Dr. Val Arzola following labs for further discussion of ED.    Reviewed and approved by JOVAN LOW on 11/1/23 at 1:53 PM.

## 2023-11-07 DIAGNOSIS — J20.9 ACUTE BRONCHITIS, UNSPECIFIED ORGANISM: ICD-10-CM

## 2023-11-07 RX ORDER — LORATADINE 10 MG/1
10 TABLET ORAL DAILY
Qty: 90 TABLET | Refills: 3 | Status: SHIPPED | OUTPATIENT
Start: 2023-11-07

## 2023-11-07 RX ORDER — MONTELUKAST SODIUM 10 MG/1
10 TABLET ORAL NIGHTLY
Qty: 90 TABLET | Refills: 1 | Status: SHIPPED | OUTPATIENT
Start: 2023-11-07 | End: 2024-05-05

## 2023-11-07 RX ORDER — FLUTICASONE PROPIONATE 50 MCG
SPRAY, SUSPENSION (ML) NASAL
Qty: 48 ML | Refills: 3 | Status: SHIPPED | OUTPATIENT
Start: 2023-11-07

## 2023-11-17 ENCOUNTER — LAB (OUTPATIENT)
Dept: LAB | Facility: LAB | Age: 33
End: 2023-11-17
Payer: COMMERCIAL

## 2023-11-17 DIAGNOSIS — N52.9 ERECTILE DYSFUNCTION, UNSPECIFIED ERECTILE DYSFUNCTION TYPE: ICD-10-CM

## 2023-11-17 LAB
CHOLEST SERPL-MCNC: 202 MG/DL (ref 0–199)
CHOLESTEROL/HDL RATIO: 2.4
HDLC SERPL-MCNC: 85.5 MG/DL
LDLC SERPL CALC-MCNC: 81 MG/DL
NON HDL CHOLESTEROL: 117 MG/DL (ref 0–149)
TESTOST SERPL-MCNC: 328 NG/DL (ref 240–1000)
TRIGL SERPL-MCNC: 179 MG/DL (ref 0–149)
VLDL: 36 MG/DL (ref 0–40)

## 2023-11-17 PROCEDURE — 36415 COLL VENOUS BLD VENIPUNCTURE: CPT

## 2023-11-17 PROCEDURE — 84403 ASSAY OF TOTAL TESTOSTERONE: CPT

## 2023-11-17 PROCEDURE — 80061 LIPID PANEL: CPT

## 2023-12-01 NOTE — ED TRIAGE NOTES
"HPI   Chief Complaint   Patient presents with    Anxiety     Panic attack triggered by a dog at the Dr. Office. Started new asthma medication last and \"blacked out\" and when he came to he found his apartment in disarray. So he went to see his dr today. Pt states he has pressure in his chest and is feeling nauseous and shaking. Pt was given Vistaril at dr office before coming and he says it hasn't done anything.       The patient was seen by me in triage capacity at with the goal of rapid assessment and initiation of care.  The patient will have a complete history and physical exam performed and documented by another provider.    Patient is a 33-year-old male who presents with panic attack.  Patient presented by EMS from his doctor's office for panic attack.  Upon arrival to the emergency department from EMS, I was instructed to quickly evaluate the patient for acute injury in the setting of triage.    Quick visual assessment reveals, cooperative patient, with comfortable respirations, stable vital signs.  No evidence of exterior trauma.  Patient not in acute distress.  Patient stable for triage to the waiting room for further evaluation and orders.   "

## 2023-12-07 ENCOUNTER — TELEMEDICINE (OUTPATIENT)
Dept: UROLOGY | Facility: CLINIC | Age: 33
End: 2023-12-07
Payer: COMMERCIAL

## 2023-12-07 DIAGNOSIS — Z13.1 SCREENING FOR DIABETES MELLITUS (DM): ICD-10-CM

## 2023-12-07 DIAGNOSIS — N52.9 ERECTILE DYSFUNCTION, UNSPECIFIED ERECTILE DYSFUNCTION TYPE: ICD-10-CM

## 2023-12-07 DIAGNOSIS — N52.8 OTHER MALE ERECTILE DYSFUNCTION: Primary | ICD-10-CM

## 2023-12-07 PROCEDURE — 99214 OFFICE O/P EST MOD 30 MIN: CPT | Performed by: UROLOGY

## 2023-12-07 RX ORDER — TADALAFIL 5 MG/1
5 TABLET ORAL DAILY
Qty: 30 TABLET | Refills: 3 | Status: SHIPPED | OUTPATIENT
Start: 2023-12-07 | End: 2024-03-19

## 2023-12-07 NOTE — PROGRESS NOTES
Virtual or Telephone Consent    An interactive audio and video telecommunication system which permits real time communications between the patient (at the originating site) and provider (at the distant site) was utilized to provide this telehealth service.   Patient provided consent    Today's visit:  Amos Small is 33 y.o. yo referred by Zainab for erectile dysfunction.    Lifelong, since 19  10 when he gets them but not consistent      Morning Erections: present  s/p prostatectomy: No  Hernia Repair?:  No  On nitrates/NTG?: No  Has coarctation of aorta and HTN  Smoker? No    Tried PDE5is?: No  Libido/Desire: strong  Have been on Testosterone /anabolic steroids? No      Labs  Component      Latest Ref Rng 9/12/2023 11/17/2023   WBC      4.4 - 11.3 x10E9/L 5.3     nRBC      0.0 - 0.0 /100 WBC 0.0     RBC      4.50 - 5.90 x10E12/L 5.00     HEMOGLOBIN      13.5 - 17.5 g/dL 15.5     HEMATOCRIT      41.0 - 52.0 % 46.9     MCV      80 - 100 fL 94     MCHC      32.0 - 36.0 g/dL 33.0     Platelets      150 - 450 x10E9/L 207     RED CELL DISTRIBUTION WIDTH      11.5 - 14.5 % 12.2     Neutrophils %      40.0 - 80.0 % 48.3     Immature Granulocytes %, Automated      0.0 - 0.9 % 0.2     Lymphocytes %      13.0 - 44.0 % 37.2     Monocytes %      2.0 - 10.0 % 10.2     Eosinophils %      0.0 - 6.0 % 3.2     Basophils %      0.0 - 2.0 % 0.9     Neutrophils Absolute      1.20 - 7.70 x10E9/L 2.56     Lymphocytes Absolute      1.20 - 4.80 x10E9/L 1.97     Monocytes Absolute      0.10 - 1.00 x10E9/L 0.54     Eosinophils Absolute      0.00 - 0.70 x10E9/L 0.17     Basophils Absolute      0.00 - 0.10 x10E9/L 0.05     GLUCOSE      74 - 99 mg/dL 91     SODIUM      136 - 145 mmol/L 141     POTASSIUM      3.5 - 5.3 mmol/L 4.5     CHLORIDE      98 - 107 mmol/L 98     Bicarbonate      21 - 32 mmol/L 33 (H)     Anion Gap      10 - 20 mmol/L 15     Blood Urea Nitrogen      6 - 23 mg/dL 14     Creatinine      0.50 - 1.30 mg/dL 0.97     GFR  MALE      >90 mL/min/1.73m2 >90     Calcium      8.6 - 10.6 mg/dL 10.8 (H)     Albumin      3.4 - 5.0 g/dL 5.0     Alkaline Phosphatase      33 - 120 U/L 51     Total Protein      6.4 - 8.2 g/dL 7.9     AST      9 - 39 U/L 35     Bilirubin Total      0.0 - 1.2 mg/dL 0.7     ALT      10 - 52 U/L 51     CHOLESTEROL      0 - 199 mg/dL  202 (H)    HDL CHOLESTEROL      mg/dL  85.5    Cholesterol/HDL Ratio  2.4    LDL Calculated      <=99 mg/dL  81    VLDL      0 - 40 mg/dL  36    TRIGLYCERIDES      0 - 149 mg/dL  179 (H)    Non HDL Cholesterol      0 - 149 mg/dL  117    Thyroid Stimulating Hormone      0.44 - 3.98 mIU/L 3.93     Testosterone      240 - 1,000 ng/dL  328         PMH:  Past Medical History:   Diagnosis Date    ADD (attention deficit disorder)     Anxiety     Depression     GERD (gastroesophageal reflux disease)     Hypertension     Hypothyroidism         PSH:  Past Surgical History:   Procedure Laterality Date    CARDIAC SURGERY  09/30/2013    Heart Surgery        Medications:    Current Outpatient Medications:     albuterol (Proventil HFA) 90 mcg/actuation inhaler, Inhale 2 puffs every 4 hours if needed for wheezing or shortness of breath., Disp: 8 g, Rfl: 2    amLODIPine (Norvasc) 10 mg tablet, TAKE 1 TABLET BY MOUTH EVERY DAY AS DIRECTED, Disp: 90 tablet, Rfl: 1    amphetamine sulfate 10 mg tablet, Take by mouth twice a day., Disp: , Rfl:     busPIRone (Buspar) 30 mg tablet, Take by mouth., Disp: , Rfl:     cetirizine (ZyrTEC) 10 mg tablet, Take 1 tablet (10 mg) by mouth once daily., Disp: , Rfl:     cholecalciferol (Vitamin D-3) 25 MCG (1000 UT) capsule, TAKE 1 CAPSULE (25 MCG) BY MOUTH ONCE DAILY., Disp: 90 capsule, Rfl: 0    diclofenac sodium (Voltaren) 1 % gel gel, APPLY 1 APPLICATION TOPICALLY ONCE DAILY. APPLY SPARINGLY TO AFFECTED AREA, Disp: 100 g, Rfl: 2    fluticasone (Flonase) 50 mcg/actuation nasal spray, PLEASE SEE ATTACHED FOR DETAILED DIRECTIONS, Disp: 48 mL, Rfl: 3    hydrocortisone 2.5 %  cream, Apply topically once daily as needed. Apply once, Disp: , Rfl:     hydrOXYzine pamoate (VistariL) 25 mg capsule, Take 1 capsule (25 mg) by mouth 3 times a day as needed for itching for up to 10 days., Disp: 30 capsule, Rfl: 0    levothyroxine (Synthroid, Levoxyl) 75 mcg tablet, Take 1 tablet (75 mcg) by mouth once daily., Disp: 90 tablet, Rfl: 1    loratadine (Claritin) 10 mg tablet, TAKE 1 TABLET BY MOUTH EVERY DAY, Disp: 90 tablet, Rfl: 3    montelukast (Singulair) 10 mg tablet, TAKE 1 TABLET (10 MG) BY MOUTH ONCE DAILY AT BEDTIME., Disp: 90 tablet, Rfl: 1    QUEtiapine (SEROquel) 50 mg tablet, Take 1 tablet (50 mg) by mouth once daily at bedtime., Disp: , Rfl:     Allergy:  Allergies   Allergen Reactions    Poison Ivy Extract Unknown    Buprenorphine Other     Mental break down    Gabapentin Other     Mental break down    Singulair [Montelukast] Other        Exam  CONSTITUTIONAL:        No acute distress    HEAD:        Normocephalic and atraumatic    CHEST / RESPIRATORY      no excess work of breathing, no respiratory distress,    ABDOMEN / GASTROINTESTINAL:        Abdomen nondistended          Assessment/Plan  Erectile Dysfunction: I discussed the etiology and different treatment modalities for erectile dysfunction. Specifically, we talked about lifestyle factors like smoking, diet, and exercise. We also discussed ED as a sign of systemic disease, and the contributions of elevated cholesterol and elevated blood sugars on erections. We then discussed treatment modalities, specifically PDE5 inhibitors, intracavernosal injections, vacuum erectile devices, and penile prostheses.    ED panel  Trial of Cialis 5mg daily - medication counseling done. Can start with half tab. Discussed side effects including priapism, headaches, stuffiness, and back pain. Discussed that if he gets an erection >4 hours, he needs to present to the emergency room or risk worsening of his erectile dysfunction long term.   Dr. Saha  consult    Fu with CNP in 3 months

## 2023-12-12 ENCOUNTER — APPOINTMENT (OUTPATIENT)
Dept: PRIMARY CARE | Facility: CLINIC | Age: 33
End: 2023-12-12
Payer: COMMERCIAL

## 2023-12-12 ENCOUNTER — OFFICE VISIT (OUTPATIENT)
Dept: PRIMARY CARE | Facility: CLINIC | Age: 33
End: 2023-12-12
Payer: COMMERCIAL

## 2023-12-12 VITALS
SYSTOLIC BLOOD PRESSURE: 132 MMHG | WEIGHT: 166 LBS | DIASTOLIC BLOOD PRESSURE: 76 MMHG | HEIGHT: 70 IN | BODY MASS INDEX: 23.77 KG/M2

## 2023-12-12 DIAGNOSIS — I10 PRIMARY HYPERTENSION: ICD-10-CM

## 2023-12-12 DIAGNOSIS — R53.83 OTHER FATIGUE: ICD-10-CM

## 2023-12-12 DIAGNOSIS — J45.909 ASTHMA, UNSPECIFIED ASTHMA SEVERITY, UNSPECIFIED WHETHER COMPLICATED, UNSPECIFIED WHETHER PERSISTENT (HHS-HCC): ICD-10-CM

## 2023-12-12 DIAGNOSIS — F98.8 ATTENTION DEFICIT DISORDER, UNSPECIFIED HYPERACTIVITY PRESENCE: ICD-10-CM

## 2023-12-12 DIAGNOSIS — M17.10 ARTHRITIS OF KNEE: ICD-10-CM

## 2023-12-12 DIAGNOSIS — D22.9 NUMEROUS MOLES: ICD-10-CM

## 2023-12-12 DIAGNOSIS — E55.9 VITAMIN D DEFICIENCY: ICD-10-CM

## 2023-12-12 DIAGNOSIS — Z00.00 HEALTH MAINTENANCE EXAMINATION: ICD-10-CM

## 2023-12-12 DIAGNOSIS — F84.0 AUTISM (HHS-HCC): Primary | ICD-10-CM

## 2023-12-12 DIAGNOSIS — E78.00 HIGH CHOLESTEROL: ICD-10-CM

## 2023-12-12 PROCEDURE — G0439 PPPS, SUBSEQ VISIT: HCPCS | Performed by: INTERNAL MEDICINE

## 2023-12-12 PROCEDURE — 99213 OFFICE O/P EST LOW 20 MIN: CPT | Performed by: INTERNAL MEDICINE

## 2023-12-12 PROCEDURE — 3075F SYST BP GE 130 - 139MM HG: CPT | Performed by: INTERNAL MEDICINE

## 2023-12-12 PROCEDURE — 3078F DIAST BP <80 MM HG: CPT | Performed by: INTERNAL MEDICINE

## 2023-12-12 PROCEDURE — 1036F TOBACCO NON-USER: CPT | Performed by: INTERNAL MEDICINE

## 2023-12-12 PROCEDURE — 93000 ELECTROCARDIOGRAM COMPLETE: CPT | Performed by: INTERNAL MEDICINE

## 2023-12-12 RX ORDER — IBUPROFEN 600 MG/1
600 TABLET ORAL 4 TIMES DAILY PRN
Qty: 90 TABLET | Refills: 5 | Status: SHIPPED | OUTPATIENT
Start: 2023-12-12 | End: 2024-12-11

## 2023-12-12 ASSESSMENT — ENCOUNTER SYMPTOMS
DEPRESSION: 0
OCCASIONAL FEELINGS OF UNSTEADINESS: 0
LOSS OF SENSATION IN FEET: 0

## 2023-12-13 ENCOUNTER — LAB (OUTPATIENT)
Dept: LAB | Facility: LAB | Age: 33
End: 2023-12-13
Payer: COMMERCIAL

## 2023-12-13 DIAGNOSIS — I10 PRIMARY HYPERTENSION: ICD-10-CM

## 2023-12-13 DIAGNOSIS — Z13.1 SCREENING FOR DIABETES MELLITUS (DM): ICD-10-CM

## 2023-12-13 DIAGNOSIS — E78.00 HIGH CHOLESTEROL: ICD-10-CM

## 2023-12-13 DIAGNOSIS — Z00.00 HEALTH MAINTENANCE EXAMINATION: ICD-10-CM

## 2023-12-13 DIAGNOSIS — N52.9 ERECTILE DYSFUNCTION, UNSPECIFIED ERECTILE DYSFUNCTION TYPE: ICD-10-CM

## 2023-12-13 DIAGNOSIS — R53.83 OTHER FATIGUE: ICD-10-CM

## 2023-12-13 DIAGNOSIS — E55.9 VITAMIN D DEFICIENCY: ICD-10-CM

## 2023-12-13 LAB
25(OH)D3 SERPL-MCNC: 16 NG/ML (ref 30–100)
ALBUMIN SERPL BCP-MCNC: 4.5 G/DL (ref 3.4–5)
ALP SERPL-CCNC: 56 U/L (ref 33–120)
ALT SERPL W P-5'-P-CCNC: 40 U/L (ref 10–52)
ANION GAP SERPL CALC-SCNC: 14 MMOL/L (ref 10–20)
APPEARANCE UR: ABNORMAL
AST SERPL W P-5'-P-CCNC: 35 U/L (ref 9–39)
BILIRUB SERPL-MCNC: 0.8 MG/DL (ref 0–1.2)
BILIRUB UR STRIP.AUTO-MCNC: NEGATIVE MG/DL
BUN SERPL-MCNC: 18 MG/DL (ref 6–23)
CALCIUM SERPL-MCNC: 9.9 MG/DL (ref 8.6–10.6)
CHLORIDE SERPL-SCNC: 100 MMOL/L (ref 98–107)
CO2 SERPL-SCNC: 31 MMOL/L (ref 21–32)
COLOR UR: ABNORMAL
CREAT SERPL-MCNC: 1.09 MG/DL (ref 0.5–1.3)
ERYTHROCYTE [DISTWIDTH] IN BLOOD BY AUTOMATED COUNT: 12.1 % (ref 11.5–14.5)
EST. AVERAGE GLUCOSE BLD GHB EST-MCNC: 100 MG/DL
FSH SERPL-ACNC: 3.1 IU/L
GFR SERPL CREATININE-BSD FRML MDRD: >90 ML/MIN/1.73M*2
GLUCOSE SERPL-MCNC: 79 MG/DL (ref 74–99)
GLUCOSE UR STRIP.AUTO-MCNC: NEGATIVE MG/DL
HBA1C MFR BLD: 5.1 %
HCT VFR BLD AUTO: 48.6 % (ref 41–52)
HGB BLD-MCNC: 16.3 G/DL (ref 13.5–17.5)
KETONES UR STRIP.AUTO-MCNC: ABNORMAL MG/DL
LEUKOCYTE ESTERASE UR QL STRIP.AUTO: NEGATIVE
LH SERPL-ACNC: 6.1 IU/L
MCH RBC QN AUTO: 30.9 PG (ref 26–34)
MCHC RBC AUTO-ENTMCNC: 33.5 G/DL (ref 32–36)
MCV RBC AUTO: 92 FL (ref 80–100)
MUCOUS THREADS #/AREA URNS AUTO: NORMAL /LPF
NITRITE UR QL STRIP.AUTO: NEGATIVE
NRBC BLD-RTO: 0 /100 WBCS (ref 0–0)
PH UR STRIP.AUTO: 5 [PH]
PLATELET # BLD AUTO: 180 X10*3/UL (ref 150–450)
POTASSIUM SERPL-SCNC: 4.5 MMOL/L (ref 3.5–5.3)
PROLACTIN SERPL-MCNC: 13 UG/L (ref 2–18)
PROT SERPL-MCNC: 7.9 G/DL (ref 6.4–8.2)
PROT UR STRIP.AUTO-MCNC: ABNORMAL MG/DL
RBC # BLD AUTO: 5.27 X10*6/UL (ref 4.5–5.9)
RBC # UR STRIP.AUTO: NEGATIVE /UL
RBC #/AREA URNS AUTO: NORMAL /HPF
SODIUM SERPL-SCNC: 140 MMOL/L (ref 136–145)
SP GR UR STRIP.AUTO: 1.03
TESTOST SERPL-MCNC: 548 NG/DL (ref 240–1000)
TSH SERPL-ACNC: 2.81 MIU/L (ref 0.44–3.98)
UROBILINOGEN UR STRIP.AUTO-MCNC: <2 MG/DL
VIT B12 SERPL-MCNC: 648 PG/ML (ref 211–911)
WBC # BLD AUTO: 5.1 X10*3/UL (ref 4.4–11.3)
WBC #/AREA URNS AUTO: NORMAL /HPF

## 2023-12-13 PROCEDURE — 84443 ASSAY THYROID STIM HORMONE: CPT

## 2023-12-13 PROCEDURE — 82607 VITAMIN B-12: CPT

## 2023-12-13 PROCEDURE — 84146 ASSAY OF PROLACTIN: CPT

## 2023-12-13 PROCEDURE — 83001 ASSAY OF GONADOTROPIN (FSH): CPT

## 2023-12-13 PROCEDURE — 83036 HEMOGLOBIN GLYCOSYLATED A1C: CPT

## 2023-12-13 PROCEDURE — 85027 COMPLETE CBC AUTOMATED: CPT

## 2023-12-13 PROCEDURE — 83002 ASSAY OF GONADOTROPIN (LH): CPT

## 2023-12-13 PROCEDURE — 81001 URINALYSIS AUTO W/SCOPE: CPT

## 2023-12-13 PROCEDURE — 84403 ASSAY OF TOTAL TESTOSTERONE: CPT

## 2023-12-13 PROCEDURE — 82306 VITAMIN D 25 HYDROXY: CPT

## 2023-12-13 PROCEDURE — 36415 COLL VENOUS BLD VENIPUNCTURE: CPT

## 2023-12-13 PROCEDURE — 80053 COMPREHEN METABOLIC PANEL: CPT

## 2023-12-13 NOTE — PROGRESS NOTES
"Subjective   Patient ID: Amos Small is a 33 y.o. male who presents for Annual Exam.    1. This 33-year-old young man today came here for a physical.  He understands it is a covered benefit under insurance.  His insurance company wants a physical.  2. Asthma.  He wants asthma checkup done.  He wants lung function tests.  3. Follow-up on other condition.    IMMUNIZATION: Tetanus within the last 10 years.    I have personally reviewed the patient's Past Medical History, Medications, Allergies, Social History, and Family History in the EMR.    Review of Systems   All other systems reviewed and are negative.  He never had a heart attack.  No stroke.  No diabetes.    Objective   /76   Ht 1.778 m (5' 10\")   Wt 75.3 kg (166 lb)   BMI 23.82 kg/m²     Physical Exam  Vitals reviewed.   HENT:      Right Ear: Tympanic membrane, ear canal and external ear normal.      Left Ear: Tympanic membrane, ear canal and external ear normal.   Eyes:      General: No scleral icterus.     Pupils: Pupils are equal, round, and reactive to light.   Neck:      Vascular: No carotid bruit.   Cardiovascular:      Heart sounds: Normal heart sounds, S1 normal and S2 normal. No murmur heard.     No friction rub.   Pulmonary:      Effort: Pulmonary effort is normal.      Breath sounds: Normal breath sounds and air entry.   Abdominal:      Palpations: There is no hepatomegaly, splenomegaly or mass.   Musculoskeletal:         General: No swelling or deformity. Normal range of motion.      Cervical back: Neck supple.      Right lower leg: No edema.      Left lower leg: No edema.   Lymphadenopathy:      Cervical: No cervical adenopathy.      Upper Body:      Right upper body: No axillary adenopathy.      Left upper body: No axillary adenopathy.      Lower Body: No right inguinal adenopathy. No left inguinal adenopathy.   Skin:     Comments: Moles and freckles.   Neurological:      Mental Status: He is oriented to person, place, and time.      " Cranial Nerves: Cranial nerves 2-12 are intact. No cranial nerve deficit.      Sensory: No sensory deficit.      Motor: Motor function is intact. No weakness.      Gait: Gait is intact.      Deep Tendon Reflexes: Reflexes normal.   Psychiatric:         Mood and Affect: Mood normal. Mood is not anxious or depressed. Affect is not angry.         Behavior: Behavior is not agitated.         Thought Content: Thought content normal.         Judgment: Judgment normal.     LAB WORK: Laboratory testing discussed.    Assessment/Plan   Problem List Items Addressed This Visit             ICD-10-CM       Cardiac and Vasculature    Hypertension I10    Relevant Orders    CBC    Urinalysis with Reflex Microscopic    TSH     Other Visit Diagnoses         Codes    Autism    -  Primary F84.0    Arthritis of knee     M17.10    Relevant Medications    ibuprofen 600 mg tablet    High cholesterol     E78.00    Relevant Orders    Comprehensive metabolic panel    Other fatigue     R53.83    Relevant Orders    Vitamin B12    Vitamin D deficiency     E55.9    Relevant Orders    Vitamin D 25-Hydroxy,Total (for eval of Vitamin D levels)    Health maintenance examination     Z00.00    Relevant Orders    CBC    Comprehensive metabolic panel    Urinalysis with Reflex Microscopic    TSH    Vitamin B12    ECG 12 lead (Clinic Performed)    Asthma, unspecified asthma severity, unspecified whether complicated, unspecified whether persistent     J45.909    Numerous moles     D22.9    Relevant Orders    Referral to Dermatology    Attention deficit disorder, unspecified hyperactivity presence     F98.8        1. Essentially normal physical.  2. Skin.  He sees  Skin team.  3. Asthma flaring up.  Pulmonary function tests, ABG ordered.  Continue inhaler.  Monitor.  He might need allergy testing.  4. Autism, monitor.  5. ADD, monitor.  6. Complete blood work ordered.  7. Follow-up appointment with me in a week after the testing.  8. Immunization  up-to-date.  9. The patient wants a flu shot, given.    Scribe Attestation  By signing my name below, I, Mary Wylie attest that this documentation has been prepared under the direction and in the presence of Rambo Mccain MD.

## 2023-12-21 ENCOUNTER — OFFICE VISIT (OUTPATIENT)
Dept: PRIMARY CARE | Facility: CLINIC | Age: 33
End: 2023-12-21
Payer: COMMERCIAL

## 2023-12-21 VITALS
WEIGHT: 168 LBS | BODY MASS INDEX: 24.05 KG/M2 | HEIGHT: 70 IN | DIASTOLIC BLOOD PRESSURE: 76 MMHG | SYSTOLIC BLOOD PRESSURE: 130 MMHG

## 2023-12-21 DIAGNOSIS — M19.90 ARTHRITIS: ICD-10-CM

## 2023-12-21 DIAGNOSIS — I10 PRIMARY HYPERTENSION: Primary | ICD-10-CM

## 2023-12-21 PROCEDURE — 99213 OFFICE O/P EST LOW 20 MIN: CPT | Performed by: INTERNAL MEDICINE

## 2023-12-21 PROCEDURE — 3075F SYST BP GE 130 - 139MM HG: CPT | Performed by: INTERNAL MEDICINE

## 2023-12-21 PROCEDURE — 3078F DIAST BP <80 MM HG: CPT | Performed by: INTERNAL MEDICINE

## 2023-12-21 PROCEDURE — 1036F TOBACCO NON-USER: CPT | Performed by: INTERNAL MEDICINE

## 2023-12-22 DIAGNOSIS — Z13.1 SCREENING FOR DIABETES MELLITUS (DM): ICD-10-CM

## 2023-12-22 DIAGNOSIS — Z13.6 ENCOUNTER FOR LIPID SCREENING FOR CARDIOVASCULAR DISEASE: ICD-10-CM

## 2023-12-22 DIAGNOSIS — N52.9 ERECTILE DYSFUNCTION, UNSPECIFIED ERECTILE DYSFUNCTION TYPE: ICD-10-CM

## 2023-12-22 DIAGNOSIS — Z13.220 ENCOUNTER FOR LIPID SCREENING FOR CARDIOVASCULAR DISEASE: ICD-10-CM

## 2023-12-22 DIAGNOSIS — Z00.00 HEALTH MAINTENANCE EXAMINATION: ICD-10-CM

## 2023-12-22 NOTE — PROGRESS NOTES
"Subjective   Patient ID: Amos Small is a 33 y.o. male who presents for Follow-up (multiple medical issues.).    Amos Small today came here for multiple medical issues.  Overall, he is a happy person.  He had blood work taken.  Feeling good.    I have personally reviewed the patient's Past Medical History, Medications, Allergies, Social History, and Family History in the EMR.    Review of Systems   All other systems reviewed and are negative.    Objective   /76   Ht 1.778 m (5' 10\")   Wt 76.2 kg (168 lb)   BMI 24.11 kg/m²     Physical Exam  Vitals reviewed.   Cardiovascular:      Heart sounds: Normal heart sounds, S1 normal and S2 normal. No murmur heard.     No friction rub.   Pulmonary:      Effort: Pulmonary effort is normal.      Breath sounds: Normal breath sounds and air entry.   Abdominal:      Palpations: There is no hepatomegaly, splenomegaly or mass.   Musculoskeletal:      Right lower leg: No edema.      Left lower leg: No edema.   Lymphadenopathy:      Lower Body: No right inguinal adenopathy. No left inguinal adenopathy.   Neurological:      Cranial Nerves: Cranial nerves 2-12 are intact.      Sensory: No sensory deficit.      Motor: Motor function is intact.      Deep Tendon Reflexes: Reflexes are normal and symmetric.     LAB WORK: Laboratory testing discussed.    Assessment/Plan   Problem List Items Addressed This Visit             ICD-10-CM       Cardiac and Vasculature    Hypertension - Primary I10     Other Visit Diagnoses         Codes    Arthritis     M19.90        1. Hypertension, okay.  2. ______.  3. Arthritis, okay.  4. Follow up in six to eight weeks.    Scribe Attestation  By signing my name below, IOneyda Scribe attest that this documentation has been prepared under the direction and in the presence of Rambo Mccain MD.   "

## 2023-12-26 ENCOUNTER — APPOINTMENT (OUTPATIENT)
Dept: RADIOLOGY | Facility: HOSPITAL | Age: 33
End: 2023-12-26
Payer: COMMERCIAL

## 2023-12-26 ENCOUNTER — HOSPITAL ENCOUNTER (EMERGENCY)
Facility: HOSPITAL | Age: 33
Discharge: HOME | End: 2023-12-26
Attending: STUDENT IN AN ORGANIZED HEALTH CARE EDUCATION/TRAINING PROGRAM
Payer: COMMERCIAL

## 2023-12-26 ENCOUNTER — APPOINTMENT (OUTPATIENT)
Dept: CARDIOLOGY | Facility: HOSPITAL | Age: 33
End: 2023-12-26
Payer: COMMERCIAL

## 2023-12-26 VITALS
DIASTOLIC BLOOD PRESSURE: 79 MMHG | WEIGHT: 165 LBS | HEART RATE: 88 BPM | RESPIRATION RATE: 18 BRPM | TEMPERATURE: 98.4 F | SYSTOLIC BLOOD PRESSURE: 148 MMHG | BODY MASS INDEX: 23.62 KG/M2 | HEIGHT: 70 IN | OXYGEN SATURATION: 98 %

## 2023-12-26 DIAGNOSIS — R53.1 WEAKNESS: ICD-10-CM
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
## 2023-12-26 LAB
ALBUMIN SERPL-MCNC: 4.1 G/DL (ref 3.5–5)
ALP BLD-CCNC: 58 U/L (ref 35–125)
ALT SERPL-CCNC: 573 U/L (ref 5–40)
AMPHETAMINES UR QL SCN>1000 NG/ML: NEGATIVE
ANION GAP SERPL CALC-SCNC: 13 MMOL/L
APPEARANCE UR: ABNORMAL
AST SERPL-CCNC: 1338 U/L (ref 5–40)
ATRIAL RATE: 77 BPM
BARBITURATES UR QL SCN>300 NG/ML: NEGATIVE
BASOPHILS # BLD MANUAL: 0 X10*3/UL (ref 0–0.1)
BASOPHILS NFR BLD MANUAL: 0 %
BENZODIAZ UR QL SCN>300 NG/ML: NEGATIVE
BILIRUB SERPL-MCNC: 1.2 MG/DL (ref 0.1–1.2)
BILIRUB UR STRIP.AUTO-MCNC: NEGATIVE MG/DL
BUN SERPL-MCNC: 23 MG/DL (ref 8–25)
BZE UR QL SCN>300 NG/ML: NEGATIVE
CALCIUM SERPL-MCNC: 9.1 MG/DL (ref 8.5–10.4)
CANNABINOIDS UR QL SCN>50 NG/ML: POSITIVE
CHLORIDE SERPL-SCNC: 99 MMOL/L (ref 97–107)
CO2 SERPL-SCNC: 24 MMOL/L (ref 24–31)
COLOR UR: ABNORMAL
CREAT SERPL-MCNC: 1.1 MG/DL (ref 0.4–1.6)
EOSINOPHIL # BLD MANUAL: 0 X10*3/UL (ref 0–0.7)
EOSINOPHIL NFR BLD MANUAL: 0 %
ERYTHROCYTE [DISTWIDTH] IN BLOOD BY AUTOMATED COUNT: 12.1 % (ref 11.5–14.5)
ETHANOL SERPL-MCNC: <0.01 G/DL
FENTANYL+NORFENTANYL UR QL SCN: NEGATIVE
GFR SERPL CREATININE-BSD FRML MDRD: >90 ML/MIN/1.73M*2
GLUCOSE SERPL-MCNC: 118 MG/DL (ref 65–99)
GLUCOSE UR STRIP.AUTO-MCNC: NORMAL MG/DL
HCT VFR BLD AUTO: 46.9 % (ref 41–52)
HGB BLD-MCNC: 16.2 G/DL (ref 13.5–17.5)
HOLD SPECIMEN: NORMAL
IMM GRANULOCYTES # BLD AUTO: 0.05 X10*3/UL (ref 0–0.7)
IMM GRANULOCYTES NFR BLD AUTO: 0.5 % (ref 0–0.9)
KETONES UR STRIP.AUTO-MCNC: ABNORMAL MG/DL
LEUKOCYTE ESTERASE UR QL STRIP.AUTO: ABNORMAL
LYMPHOCYTES # BLD MANUAL: 1.98 X10*3/UL (ref 1.2–4.8)
LYMPHOCYTES NFR BLD MANUAL: 19 %
MCH RBC QN AUTO: 31.5 PG (ref 26–34)
MCHC RBC AUTO-ENTMCNC: 34.5 G/DL (ref 32–36)
MCV RBC AUTO: 91 FL (ref 80–100)
METHADONE UR QL SCN>300 NG/ML: POSITIVE
MONOCYTES # BLD MANUAL: 0.83 X10*3/UL (ref 0.1–1)
MONOCYTES NFR BLD MANUAL: 8 %
MUCOUS THREADS #/AREA URNS AUTO: ABNORMAL /LPF
NEUTROPHILS # BLD MANUAL: 7.6 X10*3/UL (ref 1.2–7.7)
NEUTS BAND # BLD MANUAL: 0.94 X10*3/UL (ref 0–0.7)
NEUTS BAND NFR BLD MANUAL: 9 %
NEUTS SEG # BLD MANUAL: 6.66 X10*3/UL (ref 1.2–7)
NEUTS SEG NFR BLD MANUAL: 64 %
NITRITE UR QL STRIP.AUTO: NEGATIVE
NRBC BLD-RTO: 0 /100 WBCS (ref 0–0)
OPIATES UR QL SCN>300 NG/ML: NEGATIVE
OXYCODONE UR QL: NEGATIVE
P AXIS: 66 DEGREES
P OFFSET: 190 MS
P ONSET: 148 MS
PCP UR QL SCN>25 NG/ML: NEGATIVE
PH UR STRIP.AUTO: 6 [PH]
PLATELET # BLD AUTO: 138 X10*3/UL (ref 150–450)
POTASSIUM SERPL-SCNC: 4.1 MMOL/L (ref 3.4–5.1)
PR INTERVAL: 132 MS
PROT SERPL-MCNC: 7.6 G/DL (ref 5.9–7.9)
PROT UR STRIP.AUTO-MCNC: ABNORMAL MG/DL
Q ONSET: 214 MS
QRS COUNT: 13 BEATS
QRS DURATION: 102 MS
QT INTERVAL: 390 MS
QTC CALCULATION(BAZETT): 441 MS
QTC FREDERICIA: 423 MS
R AXIS: -40 DEGREES
RBC # BLD AUTO: 5.15 X10*6/UL (ref 4.5–5.9)
RBC # UR STRIP.AUTO: ABNORMAL /UL
RBC #/AREA URNS AUTO: ABNORMAL /HPF
RBC MORPH BLD: ABNORMAL
SODIUM SERPL-SCNC: 136 MMOL/L (ref 133–145)
SP GR UR STRIP.AUTO: 1.04
SQUAMOUS #/AREA URNS AUTO: ABNORMAL /HPF
T AXIS: 41 DEGREES
T OFFSET: 409 MS
TOTAL CELLS COUNTED BLD: 100
UROBILINOGEN UR STRIP.AUTO-MCNC: NORMAL MG/DL
VENTRICULAR RATE: 77 BPM
WBC # BLD AUTO: 10.4 X10*3/UL (ref 4.4–11.3)
WBC #/AREA URNS AUTO: ABNORMAL /HPF

## 2023-12-26 PROCEDURE — 87086 URINE CULTURE/COLONY COUNT: CPT | Mod: TRILAB | Performed by: STUDENT IN AN ORGANIZED HEALTH CARE EDUCATION/TRAINING PROGRAM

## 2023-12-26 PROCEDURE — 36415 COLL VENOUS BLD VENIPUNCTURE: CPT | Performed by: STUDENT IN AN ORGANIZED HEALTH CARE EDUCATION/TRAINING PROGRAM

## 2023-12-26 PROCEDURE — 99283 EMERGENCY DEPT VISIT LOW MDM: CPT | Mod: 25

## 2023-12-26 PROCEDURE — 99284 EMERGENCY DEPT VISIT MOD MDM: CPT | Performed by: STUDENT IN AN ORGANIZED HEALTH CARE EDUCATION/TRAINING PROGRAM

## 2023-12-26 PROCEDURE — 85007 BL SMEAR W/DIFF WBC COUNT: CPT | Performed by: STUDENT IN AN ORGANIZED HEALTH CARE EDUCATION/TRAINING PROGRAM

## 2023-12-26 PROCEDURE — 80053 COMPREHEN METABOLIC PANEL: CPT | Performed by: STUDENT IN AN ORGANIZED HEALTH CARE EDUCATION/TRAINING PROGRAM

## 2023-12-26 PROCEDURE — 81001 URINALYSIS AUTO W/SCOPE: CPT | Mod: 59 | Performed by: STUDENT IN AN ORGANIZED HEALTH CARE EDUCATION/TRAINING PROGRAM

## 2023-12-26 PROCEDURE — 93005 ELECTROCARDIOGRAM TRACING: CPT

## 2023-12-26 PROCEDURE — 80307 DRUG TEST PRSMV CHEM ANLYZR: CPT | Performed by: STUDENT IN AN ORGANIZED HEALTH CARE EDUCATION/TRAINING PROGRAM

## 2023-12-26 PROCEDURE — 82077 ASSAY SPEC XCP UR&BREATH IA: CPT | Performed by: STUDENT IN AN ORGANIZED HEALTH CARE EDUCATION/TRAINING PROGRAM

## 2023-12-26 PROCEDURE — 71045 X-RAY EXAM CHEST 1 VIEW: CPT

## 2023-12-26 PROCEDURE — 85027 COMPLETE CBC AUTOMATED: CPT | Performed by: STUDENT IN AN ORGANIZED HEALTH CARE EDUCATION/TRAINING PROGRAM

## 2023-12-26 ASSESSMENT — PAIN DESCRIPTION - LOCATION: LOCATION: GENERALIZED

## 2023-12-26 ASSESSMENT — PAIN DESCRIPTION - PAIN TYPE: TYPE: ACUTE PAIN

## 2023-12-26 ASSESSMENT — COLUMBIA-SUICIDE SEVERITY RATING SCALE - C-SSRS
2. HAVE YOU ACTUALLY HAD ANY THOUGHTS OF KILLING YOURSELF?: NO
1. IN THE PAST MONTH, HAVE YOU WISHED YOU WERE DEAD OR WISHED YOU COULD GO TO SLEEP AND NOT WAKE UP?: NO
6. HAVE YOU EVER DONE ANYTHING, STARTED TO DO ANYTHING, OR PREPARED TO DO ANYTHING TO END YOUR LIFE?: NO

## 2023-12-26 ASSESSMENT — PAIN SCALES - GENERAL: PAINLEVEL_OUTOF10: 8

## 2023-12-26 ASSESSMENT — PAIN - FUNCTIONAL ASSESSMENT: PAIN_FUNCTIONAL_ASSESSMENT: 0-10

## 2023-12-26 ASSESSMENT — PAIN DESCRIPTION - PROGRESSION: CLINICAL_PROGRESSION: NOT CHANGED

## 2023-12-26 NOTE — DISCHARGE INSTRUCTIONS
Your laboratory studies reveal mild elevation of your liver function tests but are otherwise normal.  You should follow-up with your primary care physician regarding this finding as well as finding of some protein in the urine.  You should try to drink extra fluids over the coming days.  Return to the emergency department with any worsening symptoms.    It is important to remember that your care does not end here and you must continue to monitor your condition closely. Please return to the emergency department for any worsening or concerning signs or symptoms as directed by our conversations and the discharge instructions. If you do not have a doctor please contact the referral number on your discharge instructions. Please contact any physician specialists provided in your discharge notes as it is very important to follow up with them regarding your condition. If you are unable to reach the physicians provided, please come back to the Emergency Department at any time.    Return to emergency room without delay for ANY new or worsening pains or for any other symptoms or concerns.  Return with worsening pains, nausea, vomiting, trouble breathing, palpitations, shortness of breath, inability to pass stool or urine, loss of control of stool or urine, any numbness or tingling (that is not normal for you), uncontrolled fevers, the passing of blood or other material in stool or urine, rashes, pains or for any other symptoms or concerns you may have.  You are always welcome to return to the ER at any time for any reason or for any other concerns you may have.

## 2023-12-26 NOTE — ED PROVIDER NOTES
HPI   Chief Complaint   Patient presents with    Fall     Pt states he fell 2 days ago out of his bed and has been bed ridden since.  He has pain all over and his knees been buckling.        Patient presents to the emergency department after a fall.  He fell on his left knee.  He states that he has difficulty getting up and down the stairs to his second floor apartment.  He states that he feels like there is fluid in his legs.  He denies pain anywhere at this time.                          Javan Coma Scale Score: 15                  Patient History   Past Medical History:   Diagnosis Date    ADD (attention deficit disorder)     Anxiety     Arthritis     Autism     Depression     GERD (gastroesophageal reflux disease)     High cholesterol     Hypertension     Hypothyroidism     Sexual dysfunction      Past Surgical History:   Procedure Laterality Date    CARDIAC SURGERY  09/30/2013    Heart Surgery     Family History   Adopted: Yes   Problem Relation Name Age of Onset    No Known Problems Mother's Sister      Anxiety disorder Other      Depression Other       Social History     Tobacco Use    Smoking status: Never    Smokeless tobacco: Never   Substance Use Topics    Alcohol use: Never    Drug use: Not on file       Physical Exam   ED Triage Vitals [12/26/23 0838]   Temp Heart Rate Resp BP   36.9 °C (98.4 °F) 90 18 154/86      SpO2 Temp Source Heart Rate Source Patient Position   98 % Oral Monitor Sitting      BP Location FiO2 (%)     Left arm --       Physical Exam  HENT:      Head: Normocephalic.   Musculoskeletal:         General: Normal range of motion.   Skin:     Comments: Abrasion/small laceration of the left knee.   Neurological:      Mental Status: He is alert.      Comments: Patient able to ambulate as usual.  Patient able to move all extremities appropriately.         ED Course & MDM   ED Course as of 12/26/23 1832 Tue Dec 26, 2023   0908 EKG interpreted by me: Normal sinus rhythm, rate 77.  Borderline  leftward axis deviation.  No ST or T wave abnormalities. [ML]      ED Course User Index  [ML] Sathish Diamond MD         Diagnoses as of 12/26/23 1832   Fall, initial encounter   Weakness       Medical Decision Making  Laboratory studies unremarkable except for urinalysis minimally suggestive of urinary tract infection, drug screen positive for 2 substances, and elevated liver function test.  Liver function test elevation felt to be secondary to hepatitis B infection.  Otherwise unremarkable.  Patient able to ambulate in the emergency department.  Patient advised to follow-up with primary care physician.  Return precautions given for any worsening symptoms.  Parts of this chart were completed with dictation software, please excuse any errors in transcription.        Procedure  Procedures     Sathish Diamond MD  12/26/23 7138

## 2023-12-27 LAB — BACTERIA UR CULT: NO GROWTH

## 2024-01-02 ENCOUNTER — APPOINTMENT (OUTPATIENT)
Dept: PRIMARY CARE | Facility: CLINIC | Age: 34
End: 2024-01-02
Payer: COMMERCIAL

## 2024-01-05 ENCOUNTER — APPOINTMENT (OUTPATIENT)
Dept: PRIMARY CARE | Facility: CLINIC | Age: 34
End: 2024-01-05
Payer: COMMERCIAL

## 2024-01-08 ENCOUNTER — OFFICE VISIT (OUTPATIENT)
Dept: PRIMARY CARE | Facility: CLINIC | Age: 34
End: 2024-01-08
Payer: COMMERCIAL

## 2024-01-08 ENCOUNTER — LAB (OUTPATIENT)
Dept: LAB | Facility: LAB | Age: 34
End: 2024-01-08
Payer: COMMERCIAL

## 2024-01-08 VITALS
BODY MASS INDEX: 23.62 KG/M2 | DIASTOLIC BLOOD PRESSURE: 82 MMHG | SYSTOLIC BLOOD PRESSURE: 146 MMHG | HEIGHT: 70 IN | WEIGHT: 165 LBS

## 2024-01-08 DIAGNOSIS — R60.9 EDEMA, UNSPECIFIED TYPE: ICD-10-CM

## 2024-01-08 DIAGNOSIS — I10 PRIMARY HYPERTENSION: Primary | ICD-10-CM

## 2024-01-08 DIAGNOSIS — L08.9 INFECTION OF SKIN AND SUBCUTANEOUS TISSUE: ICD-10-CM

## 2024-01-08 DIAGNOSIS — I10 PRIMARY HYPERTENSION: ICD-10-CM

## 2024-01-08 DIAGNOSIS — R53.1 GENERALIZED WEAKNESS: ICD-10-CM
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
## 2024-01-08 DIAGNOSIS — M79.89 LEG SWELLING: ICD-10-CM

## 2024-01-08 LAB
ALBUMIN SERPL BCP-MCNC: 4.3 G/DL (ref 3.4–5)
ALP SERPL-CCNC: 65 U/L (ref 33–120)
ALT SERPL W P-5'-P-CCNC: 54 U/L (ref 10–52)
ANION GAP SERPL CALC-SCNC: 15 MMOL/L (ref 10–20)
AST SERPL W P-5'-P-CCNC: 72 U/L (ref 9–39)
BILIRUB SERPL-MCNC: 1.1 MG/DL (ref 0–1.2)
BUN SERPL-MCNC: 15 MG/DL (ref 6–23)
CALCIUM SERPL-MCNC: 9.7 MG/DL (ref 8.6–10.6)
CHLORIDE SERPL-SCNC: 91 MMOL/L (ref 98–107)
CO2 SERPL-SCNC: 33 MMOL/L (ref 21–32)
CREAT SERPL-MCNC: 0.76 MG/DL (ref 0.5–1.3)
EGFRCR SERPLBLD CKD-EPI 2021: >90 ML/MIN/1.73M*2
ERYTHROCYTE [DISTWIDTH] IN BLOOD BY AUTOMATED COUNT: 11.8 % (ref 11.5–14.5)
GLUCOSE SERPL-MCNC: 85 MG/DL (ref 74–99)
HCT VFR BLD AUTO: 42.9 % (ref 41–52)
HGB BLD-MCNC: 14.3 G/DL (ref 13.5–17.5)
MCH RBC QN AUTO: 31 PG (ref 26–34)
MCHC RBC AUTO-ENTMCNC: 33.3 G/DL (ref 32–36)
MCV RBC AUTO: 93 FL (ref 80–100)
NRBC BLD-RTO: 0 /100 WBCS (ref 0–0)
PLATELET # BLD AUTO: 350 X10*3/UL (ref 150–450)
POTASSIUM SERPL-SCNC: 3.8 MMOL/L (ref 3.5–5.3)
PROT SERPL-MCNC: 7.6 G/DL (ref 6.4–8.2)
RBC # BLD AUTO: 4.61 X10*6/UL (ref 4.5–5.9)
SODIUM SERPL-SCNC: 135 MMOL/L (ref 136–145)
TSH SERPL-ACNC: 2.52 MIU/L (ref 0.44–3.98)
WBC # BLD AUTO: 13.9 X10*3/UL (ref 4.4–11.3)

## 2024-01-08 PROCEDURE — 80053 COMPREHEN METABOLIC PANEL: CPT

## 2024-01-08 PROCEDURE — 99214 OFFICE O/P EST MOD 30 MIN: CPT | Performed by: INTERNAL MEDICINE

## 2024-01-08 PROCEDURE — 36415 COLL VENOUS BLD VENIPUNCTURE: CPT

## 2024-01-08 PROCEDURE — 85027 COMPLETE CBC AUTOMATED: CPT

## 2024-01-08 PROCEDURE — 3079F DIAST BP 80-89 MM HG: CPT | Performed by: INTERNAL MEDICINE

## 2024-01-08 PROCEDURE — 81003 URINALYSIS AUTO W/O SCOPE: CPT

## 2024-01-08 PROCEDURE — 3077F SYST BP >= 140 MM HG: CPT | Performed by: INTERNAL MEDICINE

## 2024-01-08 PROCEDURE — 84443 ASSAY THYROID STIM HORMONE: CPT

## 2024-01-08 PROCEDURE — 1036F TOBACCO NON-USER: CPT | Performed by: INTERNAL MEDICINE

## 2024-01-08 RX ORDER — POTASSIUM CHLORIDE 20 MEQ/1
20 TABLET, EXTENDED RELEASE ORAL DAILY
Qty: 30 TABLET | Refills: 0 | Status: SHIPPED | OUTPATIENT
Start: 2024-01-08 | End: 2024-01-30

## 2024-01-08 RX ORDER — DOXYCYCLINE 100 MG/1
100 CAPSULE ORAL 2 TIMES DAILY
Qty: 20 CAPSULE | Refills: 0 | Status: SHIPPED | OUTPATIENT
Start: 2024-01-08 | End: 2024-01-18

## 2024-01-08 RX ORDER — FUROSEMIDE 40 MG/1
40 TABLET ORAL DAILY
Qty: 30 TABLET | Refills: 0 | Status: SHIPPED | OUTPATIENT
Start: 2024-01-08 | End: 2024-01-30

## 2024-01-08 NOTE — PROGRESS NOTES
"Subjective   Patient ID: Amos Small is a 33 y.o. male who presents for Follow-up (multiple medical issues.).    This gentleman today came here for multiple medical issues.  1.  Both legs swell up at the end of the day.  Fluid retention.  2. Difficulty in walking, he fell down.  He is feeling very weak, tired, fatigued, and exhausted.  Generalized weakness.    I have personally reviewed the patient's Past Medical History, Medications, Allergies, Social History, and Family History in the EMR.    Review of Systems   All other systems reviewed and are negative.    Objective   /82   Ht 1.778 m (5' 10\")   Wt 74.8 kg (165 lb)   BMI 23.68 kg/m²     Physical Exam  Vitals reviewed.   Cardiovascular:      Heart sounds: Normal heart sounds, S1 normal and S2 normal. No murmur heard.     No friction rub.   Pulmonary:      Effort: Pulmonary effort is normal.      Breath sounds: Normal breath sounds and air entry.   Abdominal:      Palpations: There is no hepatomegaly, splenomegaly or mass.   Musculoskeletal:      Right lower le+ Edema present.      Left lower le+ Edema present.   Lymphadenopathy:      Lower Body: No right inguinal adenopathy. No left inguinal adenopathy.   Neurological:      Cranial Nerves: Cranial nerves 2-12 are intact.      Sensory: No sensory deficit.      Motor: Motor function is intact.      Deep Tendon Reflexes: Reflexes are normal and symmetric.     LAB WORK:  Laboratory testing discussed.    Assessment/Plan   Problem List Items Addressed This Visit             ICD-10-CM       Cardiac and Vasculature    Hypertension - Primary I10    Relevant Orders    CBC    Comprehensive Metabolic Panel    Urinalysis with Reflex Microscopic    Thyroid Stimulating Hormone     Other Visit Diagnoses         Codes    Edema, unspecified type     R60.9    Relevant Medications    furosemide (Lasix) 40 mg tablet    potassium chloride CR (Klor-Con M20) 20 mEq ER tablet    doxycycline (Vibramycin) 100 mg capsule "    Other Relevant Orders    Vascular US Lower Extremity Venous Duplex Bilateral    Leg swelling     M79.89    Relevant Orders    Vascular US Lower Extremity Venous Duplex Bilateral    Generalized weakness     R53.1    Relevant Orders    Referral to Physical Therapy    Fall, initial encounter     W19.XXXA    Infection of skin and subcutaneous tissue     L08.9        1. Edema.  Lasix ad potassium given.  We are going to check kidney function.  2. Both leg swelling.  Immediate ultrasound ordered.  3. Superficial infection from the fall.  Clean with clean water.  Bacitracin and doxycycline.  4. Generalized weakness.  He will benefit from physical therapy.  5. I shall see him back in about 10 days to see the situation.    Scribe Attestation  By signing my name below, I, Mary Wylie attest that this documentation has been prepared under the direction and in the presence of Rambo Mccain MD.

## 2024-01-09 LAB
APPEARANCE UR: CLEAR
BILIRUB UR STRIP.AUTO-MCNC: NEGATIVE MG/DL
COLOR UR: ABNORMAL
GLUCOSE UR STRIP.AUTO-MCNC: NEGATIVE MG/DL
KETONES UR STRIP.AUTO-MCNC: ABNORMAL MG/DL
LEUKOCYTE ESTERASE UR QL STRIP.AUTO: NEGATIVE
NITRITE UR QL STRIP.AUTO: NEGATIVE
PH UR STRIP.AUTO: 6 [PH]
PROT UR STRIP.AUTO-MCNC: NEGATIVE MG/DL
RBC # UR STRIP.AUTO: NEGATIVE /UL
SP GR UR STRIP.AUTO: 1.02
UROBILINOGEN UR STRIP.AUTO-MCNC: <2 MG/DL

## 2024-01-10 ENCOUNTER — HOSPITAL ENCOUNTER (EMERGENCY)
Facility: HOSPITAL | Age: 34
Discharge: PSYCHIATRIC OR SUBSTANCE USE FACILITY | End: 2024-01-11
Attending: STUDENT IN AN ORGANIZED HEALTH CARE EDUCATION/TRAINING PROGRAM
Payer: COMMERCIAL

## 2024-01-10 ENCOUNTER — APPOINTMENT (OUTPATIENT)
Dept: CARDIOLOGY | Facility: HOSPITAL | Age: 34
End: 2024-01-10
Payer: COMMERCIAL

## 2024-01-10 DIAGNOSIS — R45.851 SUICIDAL IDEATIONS: ICD-10-CM

## 2024-01-10 DIAGNOSIS — F10.920 ALCOHOLIC INTOXICATION WITHOUT COMPLICATION (CMS-HCC): Primary | ICD-10-CM

## 2024-01-10 DIAGNOSIS — F10.10 ALCOHOL ABUSE: ICD-10-CM

## 2024-01-10 LAB
ALBUMIN SERPL-MCNC: 3.8 G/DL (ref 3.5–5)
ALP BLD-CCNC: 74 U/L (ref 35–125)
ALT SERPL-CCNC: 48 U/L (ref 5–40)
AMPHETAMINES UR QL SCN>1000 NG/ML: NEGATIVE
ANION GAP SERPL CALC-SCNC: 14 MMOL/L
APAP SERPL-MCNC: <5 UG/ML
AST SERPL-CCNC: 65 U/L (ref 5–40)
BARBITURATES UR QL SCN>300 NG/ML: NEGATIVE
BASOPHILS # BLD AUTO: 0.04 X10*3/UL (ref 0–0.1)
BASOPHILS # BLD AUTO: NORMAL 10*3/UL
BASOPHILS NFR BLD AUTO: 0.7 %
BASOPHILS NFR BLD AUTO: NORMAL %
BENZODIAZ UR QL SCN>300 NG/ML: NEGATIVE
BILIRUB SERPL-MCNC: 0.4 MG/DL (ref 0.1–1.2)
BUN SERPL-MCNC: 9 MG/DL (ref 8–25)
BZE UR QL SCN>300 NG/ML: NEGATIVE
CALCIUM SERPL-MCNC: 9.1 MG/DL (ref 8.5–10.4)
CANNABINOIDS UR QL SCN>50 NG/ML: NEGATIVE
CHLORIDE SERPL-SCNC: 100 MMOL/L (ref 97–107)
CO2 SERPL-SCNC: 27 MMOL/L (ref 24–31)
CREAT SERPL-MCNC: 0.8 MG/DL (ref 0.4–1.6)
EGFRCR SERPLBLD CKD-EPI 2021: >90 ML/MIN/1.73M*2
EOSINOPHIL # BLD AUTO: 0.3 X10*3/UL (ref 0–0.7)
EOSINOPHIL # BLD AUTO: NORMAL 10*3/UL
EOSINOPHIL NFR BLD AUTO: 5.4 %
EOSINOPHIL NFR BLD AUTO: NORMAL %
ERYTHROCYTE [DISTWIDTH] IN BLOOD BY AUTOMATED COUNT: 11.7 % (ref 11.5–14.5)
ERYTHROCYTE [DISTWIDTH] IN BLOOD BY AUTOMATED COUNT: NORMAL %
ETHANOL SERPL-MCNC: 0.04 G/DL
ETHANOL SERPL-MCNC: 0.21 G/DL
FENTANYL+NORFENTANYL UR QL SCN: NEGATIVE
GLUCOSE SERPL-MCNC: 110 MG/DL (ref 65–99)
HCT VFR BLD AUTO: 40.5 % (ref 41–52)
HCT VFR BLD AUTO: NORMAL %
HGB BLD-MCNC: 14.1 G/DL (ref 13.5–17.5)
HGB BLD-MCNC: NORMAL G/DL
IMM GRANULOCYTES # BLD AUTO: 0.04 X10*3/UL (ref 0–0.7)
IMM GRANULOCYTES # BLD AUTO: NORMAL 10*3/UL
IMM GRANULOCYTES NFR BLD AUTO: 0.7 % (ref 0–0.9)
IMM GRANULOCYTES NFR BLD AUTO: NORMAL %
LYMPHOCYTES # BLD AUTO: 1.73 X10*3/UL (ref 1.2–4.8)
LYMPHOCYTES # BLD AUTO: NORMAL 10*3/UL
LYMPHOCYTES NFR BLD AUTO: 31 %
LYMPHOCYTES NFR BLD AUTO: NORMAL %
MCH RBC QN AUTO: 31.1 PG (ref 26–34)
MCH RBC QN AUTO: NORMAL PG
MCHC RBC AUTO-ENTMCNC: 34.8 G/DL (ref 32–36)
MCHC RBC AUTO-ENTMCNC: NORMAL G/DL
MCV RBC AUTO: 89 FL (ref 80–100)
MCV RBC AUTO: NORMAL FL
METHADONE UR QL SCN>300 NG/ML: POSITIVE
MONOCYTES # BLD AUTO: 0.57 X10*3/UL (ref 0.1–1)
MONOCYTES # BLD AUTO: NORMAL 10*3/UL
MONOCYTES NFR BLD AUTO: 10.2 %
MONOCYTES NFR BLD AUTO: NORMAL %
NEUTROPHILS # BLD AUTO: 2.9 X10*3/UL (ref 1.2–7.7)
NEUTROPHILS # BLD AUTO: NORMAL 10*3/UL
NEUTROPHILS NFR BLD AUTO: 52 %
NEUTROPHILS NFR BLD AUTO: NORMAL %
NRBC BLD-RTO: 0 /100 WBCS (ref 0–0)
NRBC BLD-RTO: NORMAL /100{WBCS}
OPIATES UR QL SCN>300 NG/ML: NEGATIVE
OXYCODONE UR QL: NEGATIVE
PCP UR QL SCN>25 NG/ML: NEGATIVE
PLATELET # BLD AUTO: 321 X10*3/UL (ref 150–450)
PLATELET # BLD AUTO: NORMAL 10*3/UL
POTASSIUM SERPL-SCNC: 3.6 MMOL/L (ref 3.4–5.1)
PROT SERPL-MCNC: 7.6 G/DL (ref 5.9–7.9)
RBC # BLD AUTO: 4.54 X10*6/UL (ref 4.5–5.9)
RBC # BLD AUTO: NORMAL 10*6/UL
SALICYLATES SERPL-MCNC: <0 MG/DL
SARS-COV-2 RNA RESP QL NAA+PROBE: NOT DETECTED
SODIUM SERPL-SCNC: 141 MMOL/L (ref 133–145)
WBC # BLD AUTO: 5.6 X10*3/UL (ref 4.4–11.3)
WBC # BLD AUTO: NORMAL 10*3/UL

## 2024-01-10 PROCEDURE — 36415 COLL VENOUS BLD VENIPUNCTURE: CPT

## 2024-01-10 PROCEDURE — 80053 COMPREHEN METABOLIC PANEL: CPT

## 2024-01-10 PROCEDURE — 96372 THER/PROPH/DIAG INJ SC/IM: CPT

## 2024-01-10 PROCEDURE — 90839 PSYTX CRISIS INITIAL 60 MIN: CPT

## 2024-01-10 PROCEDURE — 2500000001 HC RX 250 WO HCPCS SELF ADMINISTERED DRUGS (ALT 637 FOR MEDICARE OP): Performed by: STUDENT IN AN ORGANIZED HEALTH CARE EDUCATION/TRAINING PROGRAM

## 2024-01-10 PROCEDURE — 93005 ELECTROCARDIOGRAM TRACING: CPT

## 2024-01-10 PROCEDURE — 82077 ASSAY SPEC XCP UR&BREATH IA: CPT | Mod: CCI

## 2024-01-10 PROCEDURE — 87635 SARS-COV-2 COVID-19 AMP PRB: CPT

## 2024-01-10 PROCEDURE — 80143 DRUG ASSAY ACETAMINOPHEN: CPT

## 2024-01-10 PROCEDURE — 2500000001 HC RX 250 WO HCPCS SELF ADMINISTERED DRUGS (ALT 637 FOR MEDICARE OP)

## 2024-01-10 PROCEDURE — 2500000004 HC RX 250 GENERAL PHARMACY W/ HCPCS (ALT 636 FOR OP/ED)

## 2024-01-10 PROCEDURE — 85025 COMPLETE CBC W/AUTO DIFF WBC: CPT

## 2024-01-10 PROCEDURE — 80307 DRUG TEST PRSMV CHEM ANLYZR: CPT

## 2024-01-10 PROCEDURE — 99285 EMERGENCY DEPT VISIT HI MDM: CPT | Performed by: STUDENT IN AN ORGANIZED HEALTH CARE EDUCATION/TRAINING PROGRAM

## 2024-01-10 RX ORDER — HYDROCODONE BITARTRATE AND ACETAMINOPHEN 5; 325 MG/1; MG/1
1 TABLET ORAL ONCE
Status: COMPLETED | OUTPATIENT
Start: 2024-01-10 | End: 2024-01-10

## 2024-01-10 RX ORDER — DOXYCYCLINE 100 MG/1
100 CAPSULE ORAL 2 TIMES DAILY
Status: DISCONTINUED | OUTPATIENT
Start: 2024-01-10 | End: 2024-01-11 | Stop reason: HOSPADM

## 2024-01-10 RX ORDER — KETOROLAC TROMETHAMINE 30 MG/ML
15 INJECTION, SOLUTION INTRAMUSCULAR; INTRAVENOUS ONCE
Status: COMPLETED | OUTPATIENT
Start: 2024-01-10 | End: 2024-01-10

## 2024-01-10 RX ADMIN — HYDROCODONE BITARTRATE AND ACETAMINOPHEN 1 TABLET: 5; 325 TABLET ORAL at 14:04

## 2024-01-10 RX ADMIN — KETOROLAC TROMETHAMINE 15 MG: 30 INJECTION INTRAMUSCULAR; INTRAVENOUS at 21:53

## 2024-01-10 RX ADMIN — DOXYCYCLINE HYCLATE 100 MG: 100 CAPSULE ORAL at 21:53

## 2024-01-10 SDOH — HEALTH STABILITY: MENTAL HEALTH: SUICIDAL BEHAVIOR (LIFETIME): NO

## 2024-01-10 SDOH — HEALTH STABILITY: MENTAL HEALTH: IN THE PAST WEEK, HAVE YOU BEEN HAVING THOUGHTS ABOUT KILLING YOURSELF?: NO

## 2024-01-10 SDOH — HEALTH STABILITY: MENTAL HEALTH: HAVE YOU WISHED YOU WERE DEAD OR WISHED YOU COULD GO TO SLEEP AND NOT WAKE UP?: YES

## 2024-01-10 SDOH — HEALTH STABILITY: MENTAL HEALTH: ANXIETY SYMPTOMS: GENERALIZED

## 2024-01-10 SDOH — HEALTH STABILITY: MENTAL HEALTH: HAVE YOU HAD THESE THOUGHTS AND HAD SOME INTENTION OF ACTING ON THEM?: YES

## 2024-01-10 SDOH — HEALTH STABILITY: MENTAL HEALTH: WISH TO BE DEAD (PAST 1 MONTH): YES

## 2024-01-10 SDOH — HEALTH STABILITY: MENTAL HEALTH: BEHAVIORS/MOOD: ANGRY;APPEARS INTOXICATED

## 2024-01-10 SDOH — HEALTH STABILITY: MENTAL HEALTH: ACTIVE SUICIDAL IDEATION WITH SPECIFIC PLAN AND INTENT (PAST 1 MONTH): NO

## 2024-01-10 SDOH — HEALTH STABILITY: MENTAL HEALTH: HOW DID YOU TRY TO KILL YOURSELF?: VIA OD ON ROBITUSSIN

## 2024-01-10 SDOH — HEALTH STABILITY: MENTAL HEALTH: WAS THIS WITHIN THE PAST THREE MONTHS?: YES

## 2024-01-10 SDOH — HEALTH STABILITY: MENTAL HEALTH: SUICIDE ASSESSMENT: ADULT (C-SSRS)

## 2024-01-10 SDOH — HEALTH STABILITY: MENTAL HEALTH: HAVE YOU EVER TRIED TO KILL YOURSELF?: YES

## 2024-01-10 SDOH — HEALTH STABILITY: MENTAL HEALTH: IN THE PAST FEW WEEKS, HAVE YOU WISHED YOU WERE DEAD?: YES

## 2024-01-10 SDOH — HEALTH STABILITY: MENTAL HEALTH: WHEN DID YOU TRY TO KILL YOURSELF?: 2023, 2022

## 2024-01-10 SDOH — ECONOMIC STABILITY: HOUSING INSECURITY: FEELS SAFE LIVING IN HOME: YES

## 2024-01-10 SDOH — HEALTH STABILITY: MENTAL HEALTH: ACTIVE SUICIDAL IDEATION WITH SOME INTENT TO ACT, WITHOUT SPECIFIC PLAN (PAST 1 MONTH): NO

## 2024-01-10 SDOH — HEALTH STABILITY: MENTAL HEALTH: ARE YOU HAVING THOUGHTS OF KILLING YOURSELF RIGHT NOW?: NO

## 2024-01-10 SDOH — HEALTH STABILITY: MENTAL HEALTH: NEEDS EXPRESSED: DENIES

## 2024-01-10 SDOH — HEALTH STABILITY: MENTAL HEALTH: IN THE PAST FEW WEEKS, HAVE YOU FELT THAT YOU OR YOUR FAMILY WOULD BE BETTER OFF IF YOU WERE DEAD?: NO

## 2024-01-10 SDOH — HEALTH STABILITY: MENTAL HEALTH: HAVE YOU BEEN THINKING ABOUT HOW YOU MIGHT DO THIS?: YES

## 2024-01-10 SDOH — HEALTH STABILITY: MENTAL HEALTH
HAVE YOU STARTED TO WORK OUT OR WORKED OUT THE DETAILS OF HOW TO KILL YOURSELF? DO YOU INTENT TO CARRY OUT THIS PLAN?: YES

## 2024-01-10 SDOH — HEALTH STABILITY: MENTAL HEALTH

## 2024-01-10 SDOH — HEALTH STABILITY: MENTAL HEALTH: HAVE YOU ACTUALLY HAD ANY THOUGHTS OF KILLING YOURSELF?: YES

## 2024-01-10 SDOH — HEALTH STABILITY: MENTAL HEALTH: HAVE YOU EVER DONE ANYTHING, STARTED TO DO ANYTHING, OR PREPARED TO DO ANYTHING TO END YOUR LIFE?: YES

## 2024-01-10 SDOH — HEALTH STABILITY: MENTAL HEALTH: NON-SPECIFIC ACTIVE SUICIDAL THOUGHTS (PAST 1 MONTH): YES

## 2024-01-10 SDOH — HEALTH STABILITY: MENTAL HEALTH: DEPRESSION SYMPTOMS: FEELINGS OF HELPLESSNESS;FEELINGS OF HOPELESSESS

## 2024-01-10 ASSESSMENT — COLUMBIA-SUICIDE SEVERITY RATING SCALE - C-SSRS
6. HAVE YOU EVER DONE ANYTHING, STARTED TO DO ANYTHING, OR PREPARED TO DO ANYTHING TO END YOUR LIFE?: NO
4. HAVE YOU HAD THESE THOUGHTS AND HAD SOME INTENTION OF ACTING ON THEM?: NO
5. HAVE YOU STARTED TO WORK OUT OR WORKED OUT THE DETAILS OF HOW TO KILL YOURSELF? DO YOU INTEND TO CARRY OUT THIS PLAN?: NO
1. IN THE PAST MONTH, HAVE YOU WISHED YOU WERE DEAD OR WISHED YOU COULD GO TO SLEEP AND NOT WAKE UP?: YES
2. HAVE YOU ACTUALLY HAD ANY THOUGHTS OF KILLING YOURSELF?: YES

## 2024-01-10 ASSESSMENT — LIFESTYLE VARIABLES
SUBSTANCE_ABUSE_PAST_12_MONTHS: YES
PRESCIPTION_ABUSE_PAST_12_MONTHS: NO

## 2024-01-10 ASSESSMENT — PAIN - FUNCTIONAL ASSESSMENT
PAIN_FUNCTIONAL_ASSESSMENT: 0-10
PAIN_FUNCTIONAL_ASSESSMENT: 0-10

## 2024-01-10 ASSESSMENT — PAIN DESCRIPTION - ORIENTATION
ORIENTATION: LEFT;RIGHT
ORIENTATION: LEFT;RIGHT;LOWER

## 2024-01-10 ASSESSMENT — PAIN DESCRIPTION - LOCATION
LOCATION: LEG
LOCATION: LEG

## 2024-01-10 ASSESSMENT — PAIN SCALES - GENERAL
PAINLEVEL_OUTOF10: 7
PAINLEVEL_OUTOF10: 10 - WORST POSSIBLE PAIN

## 2024-01-10 NOTE — ED PROVIDER NOTES
HPI   Chief Complaint   Patient presents with    Alcohol Problem     Bib ems after  calling 911 stating that he wants to harm himself, patient denies SI and does not have a plan, patient states that he is here because he has been self medicating with etoh, last drink was one hour ago, has been drinking for 2 days, using thc, denies drug use, patient is homeless and has BLE edema. Patient is calm and cooperative with staff on arrival to ER. States that this is a stepping stone to [facility]        Patient is a 33-year-old male who presents emergency department for evaluation for alcohol detox.  Patient states that few weeks ago he had a fall and injured his bilateral lower extremities.  He states that he has been having pain in his lower extremities ever since.  He states that he has a history of daily drinking drinking approximately 1-2 shots every day and states that since his fall he has increased his drinking to drinking most a bottle of liquor every day to cope with the pain.  He states today he felt significantly worse and was having thoughts of hurting himself and called helpline who recommended he come to the emergency department for evaluation.  He states he did speak with [facility] as well to be placed for inpatient detox from alcohol and they recommended he be cleared at the emergency department first prior to coming in for detox.  He is here today looking to get placed for detox especially given his passive suicidal ideation.  He notes that he has a history of ADHD, generalized anxiety disorder, high functioning autism, hepatitis B, and arthritis.  He states that his last drink was just prior to coming in today.        History provided by:  Patient   used: No                        [place] Coma Scale Score: 15                  Patient History   Past Medical History:   Diagnosis Date    ADD (attention deficit disorder)     Anxiety     Arthritis     Autism     Coronary  artery disease     Depression     Disease of thyroid gland     GERD (gastroesophageal reflux disease)     Hepatitis B     High cholesterol     Hypertension     Hypothyroidism     Sexual dysfunction      Past Surgical History:   Procedure Laterality Date    CARDIAC SURGERY  09/30/2013    Heart Surgery     Family History   Adopted: Yes   Problem Relation Name Age of Onset    No Known Problems Mother's Sister      Anxiety disorder Other      Depression Other       Social History     Tobacco Use    Smoking status: Never    Smokeless tobacco: Never   Substance Use Topics    Alcohol use: Yes    Drug use: Yes     Types: Marijuana       Physical Exam   ED Triage Vitals [01/10/24 1155]   Temp Heart Rate Resp BP   36.8 °C (98.2 °F) (!) 115 18 167/70      SpO2 Temp Source Heart Rate Source Patient Position   95 % Oral Monitor --      BP Location FiO2 (%)     -- --       Physical Exam  Constitutional:       Appearance: Normal appearance.   HENT:      Head: Normocephalic and atraumatic.   Cardiovascular:      Rate and Rhythm: Regular rhythm. Tachycardia present.   Pulmonary:      Effort: Pulmonary effort is normal.      Breath sounds: Normal breath sounds.   Abdominal:      General: Abdomen is flat.      Palpations: Abdomen is soft.      Tenderness: There is no abdominal tenderness.   Musculoskeletal:         General: Normal range of motion.   Skin:     General: Skin is warm and dry.   Neurological:      General: No focal deficit present.      Mental Status: He is alert and oriented to person, place, and time.         ED Course & MDM   ED Course as of 01/10/24 2210   Wed Dwayne 10, 2024   1230 Performed at 1216, HR of 111, NSR, NAD, QTc 495, no sign of STEMI or NSTEMI, no Q wave or T wave abnormality noted.    Reviewed and interpreted by me at time performed   [JM]      ED Course User Index  [JM] Carmenza Sorto MD         Diagnoses as of 01/10/24 2210   Alcoholic intoxication without complication (CMS/HCC)   Suicidal  ideations   Alcohol abuse       Medical Decision Making  Patient is a 33-year-old male presents emergency department for evaluation of alcohol detox.    EKG was interpreted by attending physician and reviewed by me.    Lab work done today included CMP, CBC, salicylate and Tylenol level, alcohol level urinalysis, urine drug screen, and COVID swab.  Lab work with urine drug screen positive for methadone, transaminitis, alcohol intoxication and otherwise unremarkable.    Scans not warranted at today's visit.    Medications given at today's visit include PO doxycycline, IM Toradol    I saw this patient in conjunction with Dr. Sorto.  Patient has some passive suicidal ideation at this time, but has no active plan.  He is requesting placement for detox from alcohol as he wants to quit drinking.  He is acutely intoxicated, but will be evaluated by crisis counselor for placement into acute rehab moving forward.  Lab work unremarkable other than mild transaminitis consistent with his history of hepatitis B.  Patient medically clear at this time and pending placement for alcohol detox moving forward.  Patient care was ongoing at time of my departure. Continuation of care and final disposition will by managed by attending physician. We discussed the pertinent history, physical exam, completed/pending test results (if applicable) and current treatment plan. Please refer to his/her chart for the patients remaining Emergency Department course and final disposition.    ** Disclaimer:  Parts of this document were written utilizing a voice to text dictation software.  Note may contain minor transcription or typographical errors that were inadvertently transcribed by the computer software.        Procedure  Procedures     Leda Faust PA-C  01/10/24 6414

## 2024-01-11 VITALS
SYSTOLIC BLOOD PRESSURE: 140 MMHG | HEART RATE: 84 BPM | OXYGEN SATURATION: 99 % | DIASTOLIC BLOOD PRESSURE: 80 MMHG | BODY MASS INDEX: 24.02 KG/M2 | TEMPERATURE: 98.1 F | RESPIRATION RATE: 18 BRPM | HEIGHT: 70 IN | WEIGHT: 167.77 LBS

## 2024-01-11 NOTE — PROGRESS NOTES
Social Work Note    Pt referred to [facility], per patients request for Dual Dx tx.  TCF [facility]. Pt declined due to medical acuity.   Pt referred to [facility].
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
## 2024-01-11 NOTE — PROGRESS NOTES
Social Work Note  TCT Rebeca at [facility] ([place]).  Referral Faxed.   TCF Rebeca at [facility] ([place]). Pt accepted by Dr Luis MD. Assigned to [address].   RN2RN [phone]

## 2024-01-12 LAB
ALBUMIN SERPL-MCNC: 4.3 G/DL (ref 3.5–5.2)
ALP SERPL-CCNC: 74 U/L (ref 40–129)
ALT SERPL-CCNC: 37 U/L (ref 0–40)
ANION GAP SERPL CALCULATED.3IONS-SCNC: 12 MMOL/L (ref 7–16)
AST SERPL-CCNC: 46 U/L (ref 0–39)
BILIRUB SERPL-MCNC: 0.5 MG/DL (ref 0–1.2)
BUN SERPL-MCNC: 10 MG/DL (ref 6–20)
CALCIUM SERPL-MCNC: 10.3 MG/DL (ref 8.6–10.2)
CHLORIDE SERPL-SCNC: 97 MMOL/L (ref 98–107)
CO2 SERPL-SCNC: 29 MMOL/L (ref 22–29)
CREAT SERPL-MCNC: 0.7 MG/DL (ref 0.7–1.2)
ERYTHROCYTE [DISTWIDTH] IN BLOOD BY AUTOMATED COUNT: 11.7 % (ref 11.5–15)
GFR SERPL CREATININE-BSD FRML MDRD: >60 ML/MIN/1.73M2
GLUCOSE SERPL-MCNC: 111 MG/DL (ref 74–99)
HCT VFR BLD AUTO: 46.9 % (ref 37–54)
HGB BLD-MCNC: 16.1 G/DL (ref 12.5–16.5)
MCH RBC QN AUTO: 31.1 PG (ref 26–35)
MCHC RBC AUTO-ENTMCNC: 34.3 G/DL (ref 32–34.5)
MCV RBC AUTO: 90.5 FL (ref 80–99.9)
PLATELET # BLD AUTO: 372 K/UL (ref 130–450)
PMV BLD AUTO: 10 FL (ref 7–12)
POTASSIUM SERPL-SCNC: 4.1 MMOL/L (ref 3.5–5)
PROT SERPL-MCNC: 8.4 G/DL (ref 6.4–8.3)
RBC # BLD AUTO: 5.18 M/UL (ref 3.8–5.8)
SODIUM SERPL-SCNC: 138 MMOL/L (ref 132–146)
WBC OTHER # BLD: 5.5 K/UL (ref 4.5–11.5)

## 2024-01-16 ENCOUNTER — APPOINTMENT (OUTPATIENT)
Dept: RADIOLOGY | Facility: CLINIC | Age: 34
End: 2024-01-16
Payer: COMMERCIAL

## 2024-01-19 ENCOUNTER — TELEMEDICINE (OUTPATIENT)
Dept: PRIMARY CARE | Facility: CLINIC | Age: 34
End: 2024-01-19
Payer: COMMERCIAL

## 2024-01-19 ENCOUNTER — APPOINTMENT (OUTPATIENT)
Dept: PRIMARY CARE | Facility: CLINIC | Age: 34
End: 2024-01-19
Payer: COMMERCIAL

## 2024-01-19 DIAGNOSIS — L03.119 CELLULITIS OF LOWER EXTREMITY, UNSPECIFIED LATERALITY: ICD-10-CM

## 2024-01-19 DIAGNOSIS — L73.9 FOLLICULITIS: ICD-10-CM

## 2024-01-19 PROCEDURE — 99213 OFFICE O/P EST LOW 20 MIN: CPT | Performed by: INTERNAL MEDICINE

## 2024-01-19 RX ORDER — BACITRACIN ZINC 500 UNIT/G
OINTMENT (GRAM) TOPICAL 2 TIMES DAILY
Qty: 14 G | Refills: 0 | Status: SHIPPED | OUTPATIENT
Start: 2024-01-19

## 2024-01-19 RX ORDER — DOXYCYCLINE 100 MG/1
100 CAPSULE ORAL 2 TIMES DAILY
Qty: 20 CAPSULE | Refills: 0 | Status: SHIPPED | OUTPATIENT
Start: 2024-01-19 | End: 2024-01-29

## 2024-01-19 RX ORDER — CIPROFLOXACIN 500 MG/1
500 TABLET ORAL 2 TIMES DAILY
Qty: 20 TABLET | Refills: 0 | Status: SHIPPED | OUTPATIENT
Start: 2024-01-19 | End: 2024-01-29

## 2024-01-19 RX ORDER — NABUMETONE 750 MG/1
750 TABLET, FILM COATED ORAL 2 TIMES DAILY
Qty: 60 TABLET | Refills: 1 | Status: SHIPPED | OUTPATIENT
Start: 2024-01-19

## 2024-01-20 NOTE — PROGRESS NOTES
Subjective   Patient ID: Amos Small is a 33 y.o. male who presents for Rash.    Mr. Small today came here for a virtual visit.  Both legs itching, scratching rash, not getting any better.  Leg pain.  I think he is scratching it.  Came for ______  on a video camera.    I have personally reviewed the patient's Past Medical History, Medications, Allergies, Social History, and Family History in the EMR.    Review of Systems   All other systems reviewed and are negative.    Objective   Virtual exam.  There were no vitals taken for this visit.    Physical Exam  Musculoskeletal:      Right lower leg: No edema.      Left lower leg: No edema.   Skin:     Comments: Physical exam on camera, it looks like the patient has mild cellulitis of pricking or some stretch marks present.  A little bit of inflammation.     LAB WORK:  Laboratory testing discussed.    Assessment/Plan   Problem List Items Addressed This Visit    None  Visit Diagnoses         Codes    Folliculitis     L73.9    Relevant Medications    ciprofloxacin (Cipro) 500 mg tablet    doxycycline (Vibramycin) 100 mg capsule    bacitracin 500 unit/gram ointment    nabumetone (Relafen) 750 mg tablet    Cellulitis of lower extremity, unspecified laterality     L03.119    Relevant Medications    ciprofloxacin (Cipro) 500 mg tablet    doxycycline (Vibramycin) 100 mg capsule    bacitracin 500 unit/gram ointment    nabumetone (Relafen) 750 mg tablet        1. Folliculitis infection, probably early cellulitis.  Soak in a clean hot water.  Bacitracin, Cipro and doxycycline.  2. ______.  Relafen given.  3. The patient has a psychiatric issue, he showed many of those.  I think the patient will benefit from inpatient psychiatric care.  He is going to contact his psychiatrist.    Scribe Attestation  By signing my name below, IOneyda Scribe attest that this documentation has been prepared under the direction and in the presence of Rambo Mccain MD.

## 2024-01-22 ENCOUNTER — HOSPITAL ENCOUNTER (EMERGENCY)
Facility: HOSPITAL | Age: 34
Discharge: HOME | End: 2024-01-22
Attending: STUDENT IN AN ORGANIZED HEALTH CARE EDUCATION/TRAINING PROGRAM
Payer: COMMERCIAL

## 2024-01-22 VITALS
OXYGEN SATURATION: 97 % | BODY MASS INDEX: 22.9 KG/M2 | DIASTOLIC BLOOD PRESSURE: 76 MMHG | WEIGHT: 160 LBS | TEMPERATURE: 98.2 F | SYSTOLIC BLOOD PRESSURE: 139 MMHG | HEART RATE: 115 BPM | RESPIRATION RATE: 15 BRPM | HEIGHT: 70 IN

## 2024-01-22 DIAGNOSIS — M25.561 CHRONIC PAIN OF BOTH KNEES: Primary | ICD-10-CM

## 2024-01-22 DIAGNOSIS — M25.562 CHRONIC PAIN OF BOTH KNEES: Primary | ICD-10-CM

## 2024-01-22 DIAGNOSIS — G89.29 CHRONIC PAIN OF BOTH KNEES: Primary | ICD-10-CM

## 2024-01-22 LAB
ANION GAP SERPL CALC-SCNC: 18 MMOL/L
BASOPHILS # BLD AUTO: 0.06 X10*3/UL (ref 0–0.1)
BASOPHILS NFR BLD AUTO: 0.9 %
BUN SERPL-MCNC: 21 MG/DL (ref 8–25)
CALCIUM SERPL-MCNC: 9.8 MG/DL (ref 8.5–10.4)
CHLORIDE SERPL-SCNC: 98 MMOL/L (ref 97–107)
CO2 SERPL-SCNC: 25 MMOL/L (ref 24–31)
CREAT SERPL-MCNC: 0.7 MG/DL (ref 0.4–1.6)
EGFRCR SERPLBLD CKD-EPI 2021: >90 ML/MIN/1.73M*2
EOSINOPHIL # BLD AUTO: 0.05 X10*3/UL (ref 0–0.7)
EOSINOPHIL NFR BLD AUTO: 0.8 %
ERYTHROCYTE [DISTWIDTH] IN BLOOD BY AUTOMATED COUNT: 12 % (ref 11.5–14.5)
GLUCOSE SERPL-MCNC: 137 MG/DL (ref 65–99)
HCT VFR BLD AUTO: 44.5 % (ref 41–52)
HGB BLD-MCNC: 15.1 G/DL (ref 13.5–17.5)
IMM GRANULOCYTES # BLD AUTO: 0.02 X10*3/UL (ref 0–0.7)
IMM GRANULOCYTES NFR BLD AUTO: 0.3 % (ref 0–0.9)
LYMPHOCYTES # BLD AUTO: 2.69 X10*3/UL (ref 1.2–4.8)
LYMPHOCYTES NFR BLD AUTO: 41.4 %
MCH RBC QN AUTO: 30.7 PG (ref 26–34)
MCHC RBC AUTO-ENTMCNC: 33.9 G/DL (ref 32–36)
MCV RBC AUTO: 90 FL (ref 80–100)
MONOCYTES # BLD AUTO: 0.56 X10*3/UL (ref 0.1–1)
MONOCYTES NFR BLD AUTO: 8.6 %
NEUTROPHILS # BLD AUTO: 3.12 X10*3/UL (ref 1.2–7.7)
NEUTROPHILS NFR BLD AUTO: 48 %
NRBC BLD-RTO: 0 /100 WBCS (ref 0–0)
PLATELET # BLD AUTO: 322 X10*3/UL (ref 150–450)
POTASSIUM SERPL-SCNC: 3.8 MMOL/L (ref 3.4–5.1)
RBC # BLD AUTO: 4.92 X10*6/UL (ref 4.5–5.9)
SODIUM SERPL-SCNC: 141 MMOL/L (ref 133–145)
WBC # BLD AUTO: 6.5 X10*3/UL (ref 4.4–11.3)

## 2024-01-22 PROCEDURE — 80048 BASIC METABOLIC PNL TOTAL CA: CPT | Performed by: PHYSICIAN ASSISTANT

## 2024-01-22 PROCEDURE — 96374 THER/PROPH/DIAG INJ IV PUSH: CPT

## 2024-01-22 PROCEDURE — 36415 COLL VENOUS BLD VENIPUNCTURE: CPT | Performed by: PHYSICIAN ASSISTANT

## 2024-01-22 PROCEDURE — 85025 COMPLETE CBC W/AUTO DIFF WBC: CPT | Performed by: PHYSICIAN ASSISTANT

## 2024-01-22 PROCEDURE — 99284 EMERGENCY DEPT VISIT MOD MDM: CPT | Performed by: STUDENT IN AN ORGANIZED HEALTH CARE EDUCATION/TRAINING PROGRAM

## 2024-01-22 PROCEDURE — 2500000004 HC RX 250 GENERAL PHARMACY W/ HCPCS (ALT 636 FOR OP/ED): Performed by: PHYSICIAN ASSISTANT

## 2024-01-22 RX ORDER — HYDROXYZINE PAMOATE 25 MG/1
25 CAPSULE ORAL ONCE
Status: COMPLETED | OUTPATIENT
Start: 2024-01-22 | End: 2024-01-22

## 2024-01-22 RX ORDER — KETOROLAC TROMETHAMINE 30 MG/ML
30 INJECTION, SOLUTION INTRAMUSCULAR; INTRAVENOUS ONCE
Status: COMPLETED | OUTPATIENT
Start: 2024-01-22 | End: 2024-01-22

## 2024-01-22 RX ADMIN — HYDROXYZINE PAMOATE 25 MG: 25 CAPSULE ORAL at 16:59

## 2024-01-22 RX ADMIN — KETOROLAC TROMETHAMINE 30 MG: 30 INJECTION, SOLUTION INTRAMUSCULAR; INTRAVENOUS at 16:59

## 2024-01-22 ASSESSMENT — COLUMBIA-SUICIDE SEVERITY RATING SCALE - C-SSRS
6. HAVE YOU EVER DONE ANYTHING, STARTED TO DO ANYTHING, OR PREPARED TO DO ANYTHING TO END YOUR LIFE?: NO
1. IN THE PAST MONTH, HAVE YOU WISHED YOU WERE DEAD OR WISHED YOU COULD GO TO SLEEP AND NOT WAKE UP?: NO
2. HAVE YOU ACTUALLY HAD ANY THOUGHTS OF KILLING YOURSELF?: NO

## 2024-01-22 ASSESSMENT — PAIN - FUNCTIONAL ASSESSMENT: PAIN_FUNCTIONAL_ASSESSMENT: 0-10

## 2024-01-22 ASSESSMENT — PAIN SCALES - GENERAL: PAINLEVEL_OUTOF10: 10 - WORST POSSIBLE PAIN

## 2024-01-22 ASSESSMENT — PAIN DESCRIPTION - LOCATION: LOCATION: LEG

## 2024-01-22 ASSESSMENT — PAIN DESCRIPTION - ORIENTATION: ORIENTATION: RIGHT;LEFT

## 2024-01-22 ASSESSMENT — PAIN DESCRIPTION - DESCRIPTORS: DESCRIPTORS: STABBING;SHARP;BURNING

## 2024-01-23 ENCOUNTER — HOSPITAL ENCOUNTER (OUTPATIENT)
Dept: VASCULAR MEDICINE | Facility: CLINIC | Age: 34
Discharge: HOME | End: 2024-01-23
Payer: COMMERCIAL

## 2024-01-23 DIAGNOSIS — M79.89 LEG SWELLING: ICD-10-CM

## 2024-01-23 DIAGNOSIS — R60.0 LOCALIZED EDEMA: ICD-10-CM

## 2024-01-23 DIAGNOSIS — I10 PRIMARY HYPERTENSION: ICD-10-CM

## 2024-01-23 DIAGNOSIS — R60.9 EDEMA, UNSPECIFIED TYPE: ICD-10-CM

## 2024-01-23 PROCEDURE — 93970 EXTREMITY STUDY: CPT | Performed by: SURGERY

## 2024-01-23 PROCEDURE — 93970 EXTREMITY STUDY: CPT

## 2024-01-23 RX ORDER — AMLODIPINE BESYLATE 10 MG/1
TABLET ORAL
Qty: 90 TABLET | Refills: 1 | Status: SHIPPED | OUTPATIENT
Start: 2024-01-23

## 2024-01-23 NOTE — ED PROVIDER NOTES
HPI   Chief Complaint   Patient presents with   • Leg Pain     Patient reports bilateral leg wounds for 3 weeks, antibiotics given and taken without relief.         33-year-old male presented emergency department the chief complaint of redness to knees bilaterally.  Has been for some time.  He has been on multiple antibiotics but does not improve the area.  He states it is painful.  He denies lightheadedness dizziness numbness weakness.  He denies injury or trauma.  No other complaint.                          [place] Coma Scale Score: 15                  Patient History   Past Medical History:   Diagnosis Date   • ADD (attention deficit disorder)    • Anxiety    • Arthritis    • Autism    • Coronary artery disease    • Depression    • Disease of thyroid gland    • GERD (gastroesophageal reflux disease)    • Hepatitis B    • High cholesterol    • Hypertension    • Hypothyroidism    • Sexual dysfunction      Past Surgical History:   Procedure Laterality Date   • CARDIAC SURGERY  09/30/2013    Heart Surgery     Family History   Adopted: Yes   Problem Relation Name Age of Onset   • No Known Problems Mother's Sister     • Anxiety disorder Other     • Depression Other       Social History     Tobacco Use   • Smoking status: Never   • Smokeless tobacco: Never   Substance Use Topics   • Alcohol use: Not Currently   • Drug use: Yes     Types: Marijuana       Physical Exam   ED Triage Vitals [01/22/24 1454]   Temp Heart Rate Respirations BP   36.8 °C (98.2 °F) (!) 115 15 139/76      Pulse Ox Temp src Heart Rate Source Patient Position   97 % -- -- --      BP Location FiO2 (%)     -- --       Physical Exam  Vitals and nursing note reviewed.   Constitutional:       Appearance: Normal appearance.   HENT:      Head: Normocephalic.      Nose: Nose normal.      Mouth/Throat:      Mouth: Mucous membranes are moist.   Cardiovascular:      Rate and Rhythm: Normal rate and regular rhythm.      Pulses: Normal pulses.      Heart sounds:  Normal heart sounds.   Pulmonary:      Effort: Pulmonary effort is normal.   Abdominal:      General: Abdomen is flat.   Musculoskeletal:         General: Normal range of motion.      Cervical back: Normal range of motion.   Skin:     General: Skin is warm and dry.   Neurological:      General: No focal deficit present.      Mental Status: He is alert and oriented to person, place, and time.   Psychiatric:         Mood and Affect: Mood normal.         ED Course & MDM   Diagnoses as of 01/22/24 2115   Chronic pain of both knees       Medical Decision Making  I have seen and evaluated this patient.  The attending physician has also seen and evaluated this patient.  Vital signs, laboratory testing and diagnostic images if applicable have been reviewed.  All laboratory and imaging is interpreted by myself unless otherwise stated.  Radiology studies are also formally interpreted by radiologist.    Patient with redness on his knees bilaterally.  Do not believe it represents a cellulitic infection.  Looks most consistent with dry skin.  Recommend Aquaphor.  Released in good condition to follow with outpatient doctor.      Labs Reviewed   BASIC METABOLIC PANEL - Abnormal       Result Value    Glucose 137 (*)     Sodium 141      Potassium 3.8      Chloride 98      Bicarbonate 25      Urea Nitrogen 21      Creatinine 0.70      eGFR >90      Calcium 9.8      Anion Gap 18     CBC WITH AUTO DIFFERENTIAL    WBC 6.5      nRBC 0.0      RBC 4.92      Hemoglobin 15.1      Hematocrit 44.5      MCV 90      MCH 30.7      MCHC 33.9      RDW 12.0      Platelets 322      Neutrophils % 48.0      Immature Granulocytes %, Automated 0.3      Lymphocytes % 41.4      Monocytes % 8.6      Eosinophils % 0.8      Basophils % 0.9      Neutrophils Absolute 3.12      Immature Granulocytes Absolute, Automated 0.02      Lymphocytes Absolute 2.69      Monocytes Absolute 0.56      Eosinophils Absolute 0.05      Basophils Absolute 0.06       No orders to  display     Medications   ketorolac (Toradol) injection 30 mg (30 mg intravenous Given 1/22/24 1659)   hydrOXYzine pamoate (Vistaril) capsule 25 mg (25 mg oral Given 1/22/24 1659)     New Prescriptions    No medications on file         Procedure  Procedures     Heriberto Voss PA-C  01/22/24 6106

## 2024-01-23 NOTE — DISCHARGE INSTRUCTIONS
Continue your antibiotics but follow-up with your rheumatologist regarding the knee pain as this may be an inflammatory issue.    Use nonsteroidal anti-inflammatory medicines for pain relief as needed.

## 2024-01-26 ENCOUNTER — DOCUMENTATION (OUTPATIENT)
Dept: PHYSICAL THERAPY | Facility: CLINIC | Age: 34
End: 2024-01-26
Payer: COMMERCIAL

## 2024-01-26 NOTE — PROGRESS NOTES
Physical Therapy                 Therapy Communication Note    Patient Name: Amos Small  MRN: [id number]  Today's Date: 1/26/2024     Discipline: Physical Therapy    Missed Visit Reason:      Missed Time: No Show    Comment:second eval he has been a no show

## 2024-02-16 LAB
ATRIAL RATE: 111 BPM
P AXIS: 77 DEGREES
P OFFSET: 202 MS
P ONSET: 149 MS
PR INTERVAL: 136 MS
Q ONSET: 217 MS
QRS COUNT: 18 BEATS
QRS DURATION: 108 MS
QT INTERVAL: 364 MS
QTC CALCULATION(BAZETT): 495 MS
QTC FREDERICIA: 447 MS
R AXIS: -8 DEGREES
T AXIS: 51 DEGREES
T OFFSET: 399 MS
VENTRICULAR RATE: 111 BPM

## 2024-03-19 ENCOUNTER — TELEMEDICINE (OUTPATIENT)
Dept: UROLOGY | Facility: CLINIC | Age: 34
End: 2024-03-19
Payer: COMMERCIAL

## 2024-03-19 DIAGNOSIS — N52.9 ERECTILE DYSFUNCTION, UNSPECIFIED ERECTILE DYSFUNCTION TYPE: ICD-10-CM

## 2024-03-19 DIAGNOSIS — N52.8 OTHER MALE ERECTILE DYSFUNCTION: ICD-10-CM

## 2024-03-19 DIAGNOSIS — R39.9 LOWER URINARY TRACT SYMPTOMS (LUTS): ICD-10-CM

## 2024-03-19 DIAGNOSIS — Z71.2 ENCOUNTER TO DISCUSS TEST RESULTS: ICD-10-CM

## 2024-03-19 DIAGNOSIS — Z09 ENCOUNTER FOR FOLLOW-UP: Primary | ICD-10-CM

## 2024-03-19 PROCEDURE — 99213 OFFICE O/P EST LOW 20 MIN: CPT | Performed by: UROLOGY

## 2024-03-19 PROCEDURE — 1036F TOBACCO NON-USER: CPT | Performed by: UROLOGY

## 2024-03-19 RX ORDER — TADALAFIL 5 MG/1
5 TABLET ORAL DAILY
Qty: 30 TABLET | Refills: 3 | Status: SHIPPED | OUTPATIENT
Start: 2024-03-19 | End: 2024-04-18

## 2024-03-19 NOTE — PROGRESS NOTES
Virtual or Telephone Consent    An interactive audio and video telecommunication system which permits real time communications between the patient (at the originating site) and provider (at the distant site) was utilized to provide this telehealth service.   Verbal consent was requested and obtained from Amos Small on this date, 03/19/24 for a telehealth visit.     Today's visit:  Amos Small is 33 y.o. yo referred by Zainab for erectile dysfunction.    Last Visit 12/7/23:  -ED panel  -Trial of Cialis 5mg daily  -Dr. Saha consult    Todays Visit:  #Erectile Dysfunction  -ED panel not completed  -has not seen Dr. Saha  -daily Cialis 5mg --> working well for his erections  -happy with current management     -Lifelong, since 19  -10/10 when he gets them but not consistent  Morning Erections: present  s/p prostatectomy: No  Hernia Repair?:  No  On nitrates/NTG?: No  Has coarctation of aorta and HTN  Smoker? No    Tried PDE5is?: No  Libido/Desire: strong  Have been on Testosterone /anabolic steroids? No        PMH:  Past Medical History:   Diagnosis Date    ADD (attention deficit disorder)     Anxiety     Arthritis     Autism     Coronary artery disease     Depression     Disease of thyroid gland     GERD (gastroesophageal reflux disease)     Hepatitis B     High cholesterol     Hypertension     Hypothyroidism     Sexual dysfunction         PSH:  Past Surgical History:   Procedure Laterality Date    CARDIAC SURGERY  09/30/2013    Heart Surgery        Medications:    Current Outpatient Medications:     albuterol (Proventil HFA) 90 mcg/actuation inhaler, Inhale 2 puffs every 4 hours if needed for wheezing or shortness of breath., Disp: 8 g, Rfl: 2    amLODIPine (Norvasc) 10 mg tablet, TAKE 1 TABLET BY MOUTH EVERY DAY AS DIRECTED, Disp: 90 tablet, Rfl: 1    amphetamine sulfate 10 mg tablet, Take by mouth twice a day., Disp: , Rfl:     bacitracin 500 unit/gram ointment, Apply topically 2 times a day., Disp: 14 g, Rfl:  0    busPIRone (Buspar) 30 mg tablet, Take by mouth., Disp: , Rfl:     cetirizine (ZyrTEC) 10 mg tablet, Take 1 tablet (10 mg) by mouth once daily., Disp: , Rfl:     cholecalciferol (Vitamin D-3) 25 MCG (1000 UT) capsule, TAKE 1 CAPSULE (25 MCG) BY MOUTH ONCE DAILY., Disp: 90 capsule, Rfl: 0    diclofenac sodium (Voltaren) 1 % gel gel, APPLY 1 APPLICATION TOPICALLY ONCE DAILY. APPLY SPARINGLY TO AFFECTED AREA, Disp: 100 g, Rfl: 0    fluticasone (Flonase) 50 mcg/actuation nasal spray, PLEASE SEE ATTACHED FOR DETAILED DIRECTIONS, Disp: 48 mL, Rfl: 3    furosemide (Lasix) 40 mg tablet, TAKE 1 TABLET BY MOUTH EVERY DAY, Disp: 90 tablet, Rfl: 0    hydrocortisone 2.5 % cream, Apply topically once daily as needed. Apply once, Disp: , Rfl:     hydrOXYzine pamoate (VistariL) 25 mg capsule, Take 1 capsule (25 mg) by mouth 3 times a day as needed for itching for up to 10 days. (Patient not taking: Reported on 1/10/2024), Disp: 30 capsule, Rfl: 0    ibuprofen 600 mg tablet, Take 1 tablet (600 mg) by mouth 4 times a day as needed for mild pain (1 - 3) (pain)., Disp: 90 tablet, Rfl: 5    Klor-Con M20 20 mEq ER tablet, TAKE 1 TABLET (20 MEQ) BY MOUTH ONCE DAILY. DO NOT CRUSH OR CHEW., Disp: 90 tablet, Rfl: 0    levothyroxine (Synthroid, Levoxyl) 75 mcg tablet, Take 1 tablet (75 mcg) by mouth once daily., Disp: 90 tablet, Rfl: 1    loratadine (Claritin) 10 mg tablet, TAKE 1 TABLET BY MOUTH EVERY DAY, Disp: 90 tablet, Rfl: 3    montelukast (Singulair) 10 mg tablet, TAKE 1 TABLET (10 MG) BY MOUTH ONCE DAILY AT BEDTIME., Disp: 90 tablet, Rfl: 1    nabumetone (Relafen) 750 mg tablet, Take 1 tablet (750 mg) by mouth 2 times a day., Disp: 60 tablet, Rfl: 1    QUEtiapine (SEROquel) 50 mg tablet, Take 1 tablet (50 mg) by mouth once daily at bedtime., Disp: , Rfl:     tadalafil (Cialis) 5 mg tablet, Take 1 tablet (5 mg) by mouth once daily. (Patient not taking: Reported on 1/10/2024), Disp: 30 tablet, Rfl: 3    Allergy:  Allergies    Allergen Reactions    Poison Ivy Extract Unknown    Buprenorphine Other     Mental break down    Gabapentin Other     Mental break down    Singulair [Montelukast] Other        Exam  CONSTITUTIONAL:        No acute distress    HEAD:        Normocephalic and atraumatic    CHEST / RESPIRATORY      no excess work of breathing, no respiratory distress,    ABDOMEN / GASTROINTESTINAL:        Abdomen nondistended      Assessment/Plan  Erectile Dysfunction: I    ED panel now  Continue Cialis 5mg daily - refilled for 3 months  Referral to Dr. Yifan Silva with CNP in 3 months    Scribe Attestation  By signing my name below, I, Mary Drew, attest that this documentation  has been prepared under the direction and in the presence of Val Arzola MD.

## 2024-04-08 ENCOUNTER — OFFICE VISIT (OUTPATIENT)
Dept: DERMATOLOGY | Facility: CLINIC | Age: 34
End: 2024-04-08
Payer: COMMERCIAL

## 2024-04-08 DIAGNOSIS — D22.9 BENIGN NEVUS: ICD-10-CM

## 2024-04-08 DIAGNOSIS — L57.9 SKIN CHANGES DUE TO CHRONIC EXPOSURE TO NONIONIZING RADIATION: ICD-10-CM

## 2024-04-08 DIAGNOSIS — L81.4 LENTIGO: ICD-10-CM

## 2024-04-08 DIAGNOSIS — D48.5 NEOPLASM OF UNCERTAIN BEHAVIOR OF SKIN: Primary | ICD-10-CM

## 2024-04-08 PROCEDURE — 1036F TOBACCO NON-USER: CPT | Performed by: NURSE PRACTITIONER

## 2024-04-08 PROCEDURE — 11102 TANGNTL BX SKIN SINGLE LES: CPT | Performed by: NURSE PRACTITIONER

## 2024-04-08 PROCEDURE — 88305 TISSUE EXAM BY PATHOLOGIST: CPT | Performed by: DERMATOLOGY

## 2024-04-08 PROCEDURE — 99203 OFFICE O/P NEW LOW 30 MIN: CPT | Performed by: NURSE PRACTITIONER

## 2024-04-08 ASSESSMENT — DERMATOLOGY PATIENT ASSESSMENT
HAVE YOU HAD OR DO YOU HAVE A STAPH INFECTION: NO
DO YOU HAVE ANY NEW OR CHANGING LESIONS: NO
HAVE YOU HAD OR DO YOU HAVE VASCULAR DISEASE: NO
ARE YOU AN ORGAN TRANSPLANT RECIPIENT: NO
DO YOU USE A TANNING BED: NO

## 2024-04-08 ASSESSMENT — ITCH NUMERIC RATING SCALE: HOW SEVERE IS YOUR ITCHING?: 0

## 2024-04-08 ASSESSMENT — DERMATOLOGY QUALITY OF LIFE (QOL) ASSESSMENT
RATE HOW BOTHERED YOU ARE BY SYMPTOMS OF YOUR SKIN PROBLEM (EG, ITCHING, STINGING BURNING, HURTING OR SKIN IRRITATION): 0 - NEVER BOTHERED
ARE THERE EXCLUSIONS OR EXCEPTIONS FOR THE QUALITY OF LIFE ASSESSMENT: NO
DATE THE QUALITY-OF-LIFE ASSESSMENT WAS COMPLETED: 66938
WHAT SINGLE SKIN CONDITION LISTED BELOW IS THE PATIENT ANSWERING THE QUALITY-OF-LIFE ASSESSMENT QUESTIONS ABOUT: NONE OF THE ABOVE
RATE HOW BOTHERED YOU ARE BY EFFECTS OF YOUR SKIN PROBLEMS ON YOUR ACTIVITIES (EG, GOING OUT, ACCOMPLISHING WHAT YOU WANT, WORK ACTIVITIES OR YOUR RELATIONSHIPS WITH OTHERS): 0 - NEVER BOTHERED
RATE HOW EMOTIONALLY BOTHERED YOU ARE BY YOUR SKIN PROBLEM (FOR EXAMPLE, WORRY, EMBARRASSMENT, FRUSTRATION): 0 - NEVER BOTHERED

## 2024-04-08 ASSESSMENT — PATIENT GLOBAL ASSESSMENT (PGA): PATIENT GLOBAL ASSESSMENT: PATIENT GLOBAL ASSESSMENT:  1 - CLEAR

## 2024-04-08 NOTE — PROGRESS NOTES
Subjective     Amos Small is a 33 y.o. male who presents for the following: Skin Check.     Review of Systems:  No other skin or systemic complaints other than what is documented elsewhere in the note.    The following portions of the chart were reviewed this encounter and updated as appropriate:   Tobacco  Allergies  Meds  Problems  Med Hx  Surg Hx         Skin Cancer History  No skin cancer on file.      Specialty Problems          Dermatology Problems    Dermatitis    Rash        Objective   Well appearing patient in no apparent distress; mood and affect are within normal limits.    A full examination was performed including scalp, head, eyes, ears, nose, lips, neck, chest, axillae, abdomen, back, buttocks, bilateral upper extremities, bilateral lower extremities, hands, feet, fingers, toes, fingernails, and toenails. All findings within normal limits unless otherwise noted below.      Assessment/Plan   1. Neoplasm of uncertain behavior of skin  Right Lower Back  10 x 4.5 mm irregular shape asymmetrical variegated dark brown to red patch          Lesion biopsy  Type of biopsy: tangential    Informed consent: discussed and consent obtained    Timeout: patient name, date of birth, surgical site, and procedure verified    Procedure prep:  Patient was prepped and draped  Anesthesia: the lesion was anesthetized in a standard fashion    Anesthetic:  1% lidocaine plain local infiltration  Instrument used: DermaBlade    Hemostasis achieved with: electrodesiccation    Outcome: patient tolerated procedure well    Post-procedure details: wound care instructions given    Additional details:  Cleaned area with isopropyl alcohol prior to anesthesia or biopsy. Applied thin layer of vaseline and covered with bandaid after procedure      Staff Communication: Dermatology Local Anesthesia: 1 % Plain Lidocaine - Amount:0.5 ml    Specimen 1 - Dermatopathology- DERM LAB  Differential Diagnosis: MM vs DN  Check Margins Yes/No?:     Comments:    Dermpath Lab: Routine Histopathology (formalin-fixed tissue)    NUB  - Given uncertainty in clinical diagnosis, shave biopsy is recommended in clinic today.  - The patient expressed understanding, is in agreement with this plan, and wishes to proceed with biopsy.  - Oral and written wound care instructions provided.  - Advised patient that the office will call within 2 weeks to discuss biopsy results.      2. Benign nevus  Scattered, uniform and benign-appearing, regular brown melanocytic papules and macules.    The present appearance of the lesion is not worrisome but it should continue to be observed and testing/treatment may be warranted if change occurs.    3. Lentigo  Scattered tan macules in sun-exposed areas.    A solar lentigo (plural, solar lentigines), also known as a sun-induced freckle or senile lentigo, is a dark (hyperpigmented) lesion caused by natural or artificial ultraviolet (UV) light. Solar lentigines may be single or multiple. This type of lentigo is different from a simple lentigo (lentigo simplex) because it is caused by exposure to UV light. Solar lentigines are benign, but they do indicate excessive sun exposure, a risk factor for the development of skin cancer.    To prevent solar lentigines, avoid exposure to sunlight in midday (10 AM to 3 PM), wear sun-protective clothing (tightly woven clothes and hats), and apply sunscreen (SPF 30 UVA and UVB block).    The present appearance of the lesion is not worrisome but it should continue to be observed and testing/treatment may be warranted if change occurs.    4. Skin changes due to chronic exposure to nonionizing radiation  Actinic changes in the form of freckles, lentigines and hyper/hypopigmentation     ABCDEs of melanoma and atypical moles were discussed with the patient.    Patient was instructed to perform monthly self skin examination.  We recommended that the patient have regular full skin exams given an increased risk of  subsequent skin cancers.    The patient was instructed to use sun protective behaviors including use of broad spectrum sunscreens and sun protective clothing to reduce risk of skin cancers.    Warning signs of non-melanoma skin cancer discussed.        Return to clinic to be determined. Will call with results

## 2024-04-08 NOTE — PATIENT INSTRUCTIONS

## 2024-04-10 LAB
LABORATORY COMMENT REPORT: NORMAL
PATH REPORT.FINAL DX SPEC: NORMAL
PATH REPORT.GROSS SPEC: NORMAL
PATH REPORT.MICROSCOPIC SPEC OTHER STN: NORMAL
PATH REPORT.RELEVANT HX SPEC: NORMAL
PATH REPORT.TOTAL CANCER: NORMAL

## 2024-04-15 ENCOUNTER — LAB (OUTPATIENT)
Dept: LAB | Facility: LAB | Age: 34
End: 2024-04-15
Payer: COMMERCIAL

## 2024-04-15 DIAGNOSIS — Z13.220 ENCOUNTER FOR LIPID SCREENING FOR CARDIOVASCULAR DISEASE: ICD-10-CM

## 2024-04-15 DIAGNOSIS — N52.9 ERECTILE DYSFUNCTION, UNSPECIFIED ERECTILE DYSFUNCTION TYPE: ICD-10-CM

## 2024-04-15 DIAGNOSIS — Z13.6 ENCOUNTER FOR LIPID SCREENING FOR CARDIOVASCULAR DISEASE: ICD-10-CM

## 2024-04-15 DIAGNOSIS — Z00.00 HEALTH MAINTENANCE EXAMINATION: ICD-10-CM

## 2024-04-15 DIAGNOSIS — Z13.1 SCREENING FOR DIABETES MELLITUS (DM): ICD-10-CM

## 2024-04-15 LAB
CHOLEST SERPL-MCNC: 203 MG/DL (ref 0–199)
CHOLESTEROL/HDL RATIO: 2.7
CREAT SERPL-MCNC: 0.77 MG/DL (ref 0.5–1.3)
EGFRCR SERPLBLD CKD-EPI 2021: >90 ML/MIN/1.73M*2
EST. AVERAGE GLUCOSE BLD GHB EST-MCNC: 103 MG/DL
FSH SERPL-ACNC: 2.5 IU/L
HBA1C MFR BLD: 5.2 %
HCT VFR BLD AUTO: 47.3 % (ref 41–52)
HDLC SERPL-MCNC: 74.5 MG/DL
LDLC SERPL CALC-MCNC: 96 MG/DL
LH SERPL-ACNC: 8.3 IU/L
NON HDL CHOLESTEROL: 129 MG/DL (ref 0–149)
PROLACTIN SERPL-MCNC: 7.3 UG/L (ref 2–18)
TESTOST SERPL-MCNC: 377 NG/DL (ref 240–1000)
TRIGL SERPL-MCNC: 165 MG/DL (ref 0–149)
VLDL: 33 MG/DL (ref 0–40)

## 2024-04-15 PROCEDURE — 83036 HEMOGLOBIN GLYCOSYLATED A1C: CPT

## 2024-04-15 PROCEDURE — 83002 ASSAY OF GONADOTROPIN (LH): CPT

## 2024-04-15 PROCEDURE — 36415 COLL VENOUS BLD VENIPUNCTURE: CPT

## 2024-04-15 PROCEDURE — 84146 ASSAY OF PROLACTIN: CPT

## 2024-04-15 PROCEDURE — 82565 ASSAY OF CREATININE: CPT

## 2024-04-15 PROCEDURE — 80061 LIPID PANEL: CPT

## 2024-04-15 PROCEDURE — 83001 ASSAY OF GONADOTROPIN (FSH): CPT

## 2024-04-15 PROCEDURE — 84403 ASSAY OF TOTAL TESTOSTERONE: CPT

## 2024-04-15 PROCEDURE — 85014 HEMATOCRIT: CPT

## 2024-04-15 NOTE — PROGRESS NOTES
Virtual or Telephone Consent    An interactive audio and video telecommunication system which permits real time communications between the patient (at the originating site) and provider (at the distant site) was utilized to provide this telehealth service.   Verbal consent was requested and obtained from Amos Small on this date, 04/16/24 for a telehealth visit.     HPI  Amos Small is 33 y.o. yo referred by Zainab for erectile dysfunction.    Last Visit 3/19/24:  -ED panel now  -Continue Cialis 5mg daily - refilled for 3 months  -Referral to Dr. Saha     Todays Visit:  #Erectile Dysfunction  -daily Cialis 5mg --> working well for his erections  -happy with current management       -Lifelong, since 19  -10/10 when he gets them but not consistent  Morning Erections: present  s/p prostatectomy: No  Hernia Repair?:  No  On nitrates/NTG?: No  Has coarctation of aorta and HTN  Smoker? No    Tried PDE5is?: No  Libido/Desire: strong  Have been on Testosterone /anabolic steroids? No    Component      Latest Ref Rng 12/13/2023   FOLLICLE STIMULATING HORMONE      IU/L 3.1    LH      IU/L 6.1    Hemoglobin A1C      see below % 5.1    Estimated Average Glucose      Not Established mg/dL 100    PROLACTIN      2.0 - 18.0 ug/L 13.0    Testosterone      240 - 1,000 ng/dL 548          PMH:  Past Medical History:   Diagnosis Date    ADD (attention deficit disorder)     Anxiety     Arthritis     Autism ([facility]-HCC)     Coronary artery disease     Depression     Disease of thyroid gland     GERD (gastroesophageal reflux disease)     Hepatitis B     High cholesterol     Hypertension     Hypothyroidism     Sexual dysfunction         PSH:  Past Surgical History:   Procedure Laterality Date    CARDIAC SURGERY  09/30/2013    Heart Surgery        Medications:    Current Outpatient Medications:     albuterol (Proventil HFA) 90 mcg/actuation inhaler, Inhale 2 puffs every 4 hours if needed for wheezing or shortness of breath., Disp: 8 g, Rfl:  2    amLODIPine (Norvasc) 10 mg tablet, TAKE 1 TABLET BY MOUTH EVERY DAY AS DIRECTED, Disp: 90 tablet, Rfl: 1    amphetamine sulfate 10 mg tablet, Take by mouth twice a day., Disp: , Rfl:     bacitracin 500 unit/gram ointment, Apply topically 2 times a day., Disp: 14 g, Rfl: 0    busPIRone (Buspar) 30 mg tablet, Take by mouth., Disp: , Rfl:     cetirizine (ZyrTEC) 10 mg tablet, Take 1 tablet (10 mg) by mouth once daily., Disp: , Rfl:     cholecalciferol (Vitamin D-3) 25 MCG (1000 UT) capsule, TAKE 1 CAPSULE (25 MCG) BY MOUTH ONCE DAILY., Disp: 90 capsule, Rfl: 0    diclofenac sodium (Voltaren) 1 % gel gel, APPLY 1 APPLICATION TOPICALLY ONCE DAILY. APPLY SPARINGLY TO AFFECTED AREA, Disp: 100 g, Rfl: 0    fluticasone (Flonase) 50 mcg/actuation nasal spray, PLEASE SEE ATTACHED FOR DETAILED DIRECTIONS, Disp: 48 mL, Rfl: 3    furosemide (Lasix) 40 mg tablet, TAKE 1 TABLET BY MOUTH EVERY DAY, Disp: 90 tablet, Rfl: 0    hydrocortisone 2.5 % cream, Apply topically once daily as needed. Apply once, Disp: , Rfl:     hydrOXYzine pamoate (VistariL) 25 mg capsule, Take 1 capsule (25 mg) by mouth 3 times a day as needed for itching for up to 10 days. (Patient not taking: Reported on 1/10/2024), Disp: 30 capsule, Rfl: 0    ibuprofen 600 mg tablet, Take 1 tablet (600 mg) by mouth 4 times a day as needed for mild pain (1 - 3) (pain)., Disp: 90 tablet, Rfl: 5    Klor-Con M20 20 mEq ER tablet, TAKE 1 TABLET (20 MEQ) BY MOUTH ONCE DAILY. DO NOT CRUSH OR CHEW., Disp: 90 tablet, Rfl: 0    levothyroxine (Synthroid, Levoxyl) 75 mcg tablet, Take 1 tablet (75 mcg) by mouth once daily., Disp: 90 tablet, Rfl: 1    loratadine (Claritin) 10 mg tablet, TAKE 1 TABLET BY MOUTH EVERY DAY, Disp: 90 tablet, Rfl: 3    montelukast (Singulair) 10 mg tablet, TAKE 1 TABLET (10 MG) BY MOUTH ONCE DAILY AT BEDTIME., Disp: 90 tablet, Rfl: 1    nabumetone (Relafen) 750 mg tablet, Take 1 tablet (750 mg) by mouth 2 times a day., Disp: 60 tablet, Rfl: 1     QUEtiapine (SEROquel) 50 mg tablet, Take 1 tablet (50 mg) by mouth once daily at bedtime., Disp: , Rfl:     tadalafil (Cialis) 5 mg tablet, Take 1 tablet (5 mg) by mouth once daily., Disp: 30 tablet, Rfl: 3    Allergy:  Allergies   Allergen Reactions    Poison Ivy Extract Unknown    Buprenorphine Other     Mental break down    Gabapentin Other     Mental break down    Singulair [Montelukast] Other        Exam  CONSTITUTIONAL:        No acute distress    HEAD:        Normocephalic and atraumatic    CHEST / RESPIRATORY      no excess work of breathing, no respiratory distress,    ABDOMEN / GASTROINTESTINAL:        Abdomen nondistended      Assessment/Plan  Erectile Dysfunction:   Continue Cialis 5mg daily - refilled for 3 months    #Elevated Cholesterol  -follow up with PCP    Fu with CNP in 1 year    Scribe Attestation  By signing my name below, Vanessa BROCK Scribe, attest that this documentation  has been prepared under the direction and in the presence of Val Arzola MD.

## 2024-04-16 ENCOUNTER — TELEMEDICINE (OUTPATIENT)
Dept: UROLOGY | Facility: CLINIC | Age: 34
End: 2024-04-16
Payer: COMMERCIAL

## 2024-04-16 DIAGNOSIS — Z09 ENCOUNTER FOR FOLLOW-UP: ICD-10-CM

## 2024-04-16 DIAGNOSIS — E78.00 ELEVATED CHOLESTEROL: ICD-10-CM

## 2024-04-16 DIAGNOSIS — N52.9 ERECTILE DYSFUNCTION, UNSPECIFIED ERECTILE DYSFUNCTION TYPE: Primary | ICD-10-CM

## 2024-04-16 PROCEDURE — 99213 OFFICE O/P EST LOW 20 MIN: CPT | Performed by: UROLOGY

## 2024-04-17 ENCOUNTER — EVALUATION (OUTPATIENT)
Dept: PHYSICAL THERAPY | Facility: CLINIC | Age: 34
End: 2024-04-17
Payer: COMMERCIAL

## 2024-04-17 DIAGNOSIS — M25.562 BILATERAL KNEE PAIN: Primary | ICD-10-CM

## 2024-04-17 DIAGNOSIS — M25.561 BILATERAL KNEE PAIN: Primary | ICD-10-CM

## 2024-04-17 PROCEDURE — 97110 THERAPEUTIC EXERCISES: CPT | Mod: GP

## 2024-04-17 PROCEDURE — 97162 PT EVAL MOD COMPLEX 30 MIN: CPT | Mod: GP

## 2024-04-17 NOTE — PROGRESS NOTES
"Physical Therapy Evaluation     Patient Name: Amos Small \"Ma-smith-david\"  MRN: [id number]  Today's Date: 4/18/2024  Time Calculation  Start Time: 1209  Stop Time: 1259  Time Calculation (min): 50 min      Insurance:  Number of Treatments Authorized: 1/[place] (1) [facility] DUAL PARTIAL - NO AUTH / [place]ISITS / 80% COVERED / OOP [id number]   2) [place]ICA[place]          Current Problem:   1. Bilateral knee pain  Follow Up In Physical Therapy          Precautions:  Precautions  Precautions Comment: Mod fall risk - reports multiple repeated falls over the past 6 months (Has ortho consult on 4/23/24)    Reason for visit: B knee pain  Referred by: Dr. Galeana    Work, mechanical stresses: on disability   Leisure, mechanical stresses:  Walk for exercise. Dance, ride bike, socialize with friends.    Prior to onset the pt was able to complete all work/leisure/functional activities without limitation.  Functional disability from present episode/Primary goal for PT: Walking with a cane, distance limited to 10-20 min. Step to pattern on steps usually leading with the right and using the cane. Was falling x 6 a day.  Roommate helps with laundry/dishes/clean the apartment and errands.   Present symptoms: 3/10 B knees  Present since: 12/24/23 and getting a little better  Commenced for no apparent reason - woke and and could not move, was paralyzed.   Symptoms at onset:  B knees, thighs and shins   Constant symptoms: B knees, thighs/shins  Intermittent symptoms: none  Pain ranges from: 4-10/10  Pt describes pain as: inflammation/ tingling, legs feel weak  Spine History: none  Symptoms are worse with: bending knees, sitting/rising/first few steps - always, standing - limited to 5 min, walking- always,sleeping - restless legs x 1 a night, as the day goes on  Symptoms are better with: Lying supine, long sitting.   Continued use makes the pain: Worse  Previous episodes: right knee issue in 2020- help PT and it resolved the sx.   Previous " treatments: ice, hot shower  Imaging: xrays - normal  Red Flags: retention report in the first month after onset of sx - denies retention over the past several months,  recent/major surgery - no, night pain - no, accidents - no falls over the past few weeks, unexplained weight loss - eating less recently not sure if he has lost weight.   Medical history: 1995 open heart surgery, Fibromyalgia, Currently receiving mental healthcare - anxiety/PTSD,High blood pressure, asthma, RA, liver disease, Hepatitis    Objective Findings:  Outcome Measures:     Posture: Poor - pt in long sitting during subjective per pt preference   Correction: NE  Knee ROM - Nil/Min/Mod/Max limit or degrees. R, L   Flex: R = 147 - ERP, L = 144 -ERP    Ext: R = nil, L = nil  MMT: Knee  Flex:  R = 4+/5, L = 4+/5  Ext: R = 2-/5, L = 2-/5    Special Tests/functional baselines:   Squat with hands on table  Legs locked straight when attempted fwd step up, heavily relied on UE assist   Gait: min/nil deviations with std cane      L/S Movement Loss - Nil/Min/Mod/Max limit or degrees   Flexion: nil   Extension: mod   R SGIS: NT   L SGIS: NT    Repeated Movement Testing -  During/after/mechanical response, Sx at baseline: 0/10 B knees legs  Prone lying x 1 min  POEx 1  min  REIl x 10 - NE, NE     Treatment:   REIL x 10   Sitting with roll for posture correction x 2 min  Educated pt on proper response to exercise, centralization/localization, produce/increase - NW principle, and assessment process.  Issued handout for HEP  HEP/med bridge:   Date: 04/17/2024  Exercises  - Prone Press Up  - 5-8 x daily - 7 x weekly - 1 sets - 10-20 reps  - Seated Correct Posture     Assessment: Pt presents to PT with complaint of B thigh/knee/lower leg pain that began on 12/24/23 without GERDA. Pt's history and response to repeated movements makes provisional classification other/psychogenic in nature, it is extremely unlikely his knees are generating his symptoms and weakness  in his legs. Pt will continue to be assessed over the next 2-3 sessions to confirm provisional classification and rule out spine/derangement. Pt will benefit from skilled PT to address ROM/strength/functional limitations and pain noted during evaluation today.    Plan of care:  Problem List: Pain, Functional limitations, activity limitations, ROM limitations, Posture, Gait, Transfer, Activity Limitations, IADLS/ADLS/Self care  Goals:  STG:  Pain = 0-3/10 by week 4  Pt will report HEP compliance by week 1  Pt will be compliant with lumbar roll by week 1   Pt will be able to amb without assistive device for >/= 20 minutes by week 6  Pt will deny any falls by week 6  LTG:  B knee ROM = nil limit in all directions without ERP by week 8  4/5 B knee ext with MMTing by week 10  Pt will achieve a clinically significant improvement in LEFS by week 12  Pt will report >/= 80% improvement/progress towards PT goals by week 12  Pt will be able to go up down stairs reciprocal without difficulty by week 12  Planned interventions: Ther ex, Neuromuscular re-ed, ther act, Manual, Education, Home program, Estim, Hot therapy, Cold therapy, Vaso  The POC was created, discussed, and agreed on with the patient.

## 2024-05-01 ENCOUNTER — APPOINTMENT (OUTPATIENT)
Dept: PHYSICAL THERAPY | Facility: CLINIC | Age: 34
End: 2024-05-01
Payer: COMMERCIAL

## 2024-05-08 ENCOUNTER — TREATMENT (OUTPATIENT)
Dept: PHYSICAL THERAPY | Facility: CLINIC | Age: 34
End: 2024-05-08
Payer: COMMERCIAL

## 2024-05-08 DIAGNOSIS — M25.561 BILATERAL KNEE PAIN: ICD-10-CM

## 2024-05-08 DIAGNOSIS — M25.562 BILATERAL KNEE PAIN: ICD-10-CM

## 2024-05-08 PROCEDURE — 97110 THERAPEUTIC EXERCISES: CPT | Mod: GP

## 2024-05-08 NOTE — PROGRESS NOTES
Physical Therapy  Physical Therapy Treatment Note    Patient Name: Amos Small  MRN: [id number]  Today's Date: 5/8/2024  Time Calculation  Start Time: 1137  Stop Time: 1225  Time Calculation (min): 48 min    Insurance:  Number of Treatments Authorized: 2/[place] (1) [facility] DUAL PARTIAL - NO AUTH / [place] VISITS / 80% COVERED / OOP $[id number]   2) [place]        Current Problem  1. Bilateral knee pain  Follow Up In Physical Therapy          Precautions  Precautions  Precautions Comment: Mod fall risk - reports multiple repeated falls over the past 6 months (Has ortho consult on 4/23/24)    Subjective   General       Patient reports:  Overall no changes.  Will feel a burning in the leg when he does the exercises. Getting burning and pins needles in legs when he is still.  Was walking to the store and fell and landed on his  right side, fell last Thursday. Pain is intermittent now in the B knees and shins. Has not noticed much of an improvement in stairs/LE strength, does feel more stable with ambulation.    Performing HEP?: Partially  X 3 a day REIL     Pain   Right thigh pain from the fall - did not rate    Objective   Amb with std cane  8 Inch fwd step  Functional Squat with UE assist   R, L Knee ext = 3-/5  Bilaterally    Treatments:  Therex:   Sitting with roll x 2 min - cues for placement   Prone lying x 1 min  JUAN DANIEL x 1 min   REIL 5 x 4 - abolish knee pain and better with amb and functional squat  Standing at plinth:   Mini squats 1 min x 2   R, L march 1 min x 2 each LE  Heel/toe raises x 1 min   Standing R, L hip abd x 1 min each LE  Standing R, L hip flexion x 1 min each LE   Juan Daniel x 1 min  REIL 5 x 2     OP EDUCATION:   5/08/2024  Exercises  - Mini Squat with Counter Support  - 1-2 x daily - 7 x weekly - 1-3 sets - 10-20 reps  - Standing Hip Extension with Counter Support  - 1-2 x daily - 7 x weekly - 1-3 sets - 10-20 reps  - Standing Hip Abduction with Counter Support  - 1-2 x daily - 7 x weekly -  1-3 sets - 10-20 reps  - Standing March with Counter Support  - 1-2 x daily - 7 x weekly - 1-3 sets - 10-20 reps  - Heel Toe Raises with Counter Support  - 1-2 x daily - 7 x weekly - 1-3 sets - 10-20 reps  - Standing Hip Flexion with Counter Support  - 1-2 x daily - 7 x weekly - 1-3 sets - 10-20 reps    Assessment:  Distal sx have gone from constant to intermittent with fair HEP compliance and he had additional improvement in baselines during recheck today, making L/S derangement likely/possible. His technique with lumbar roll use and REIL was corrected with good carryover.   Tolerated hip and quad activation exercises well, denied pain just fatigued quickly.        Plan:  F/U with REIL and posture correction, continues to assess to confirm/rule out Lumbar derangement   Progress and focus on quad activation/strengthening as tolerated.   OP PT Plan  Number of Treatments Authorized: 2/MN (1) [facility] DUAL PARTIAL - NO AUTH / [place] VISITS / 80% COVERED / OOP $[id number]   2) [place])    Yoel Hagen, PT

## 2024-05-16 ENCOUNTER — APPOINTMENT (OUTPATIENT)
Dept: PHYSICAL THERAPY | Facility: CLINIC | Age: 34
End: 2024-05-16
Payer: COMMERCIAL

## 2024-05-21 ENCOUNTER — APPOINTMENT (OUTPATIENT)
Dept: PRIMARY CARE | Facility: CLINIC | Age: 34
End: 2024-05-21
Payer: COMMERCIAL

## 2024-06-03 ENCOUNTER — APPOINTMENT (OUTPATIENT)
Dept: PRIMARY CARE | Facility: CLINIC | Age: 34
End: 2024-06-03
Payer: COMMERCIAL

## 2024-07-02 DIAGNOSIS — M25.561 PAIN IN BOTH KNEES, UNSPECIFIED CHRONICITY: ICD-10-CM

## 2024-07-02 DIAGNOSIS — M25.562 PAIN IN BOTH KNEES, UNSPECIFIED CHRONICITY: ICD-10-CM

## 2024-07-02 RX ORDER — DICLOFENAC SODIUM 10 MG/G
GEL TOPICAL
Qty: 100 G | Refills: 0 | Status: SHIPPED | OUTPATIENT
Start: 2024-07-02

## 2024-07-29 ENCOUNTER — HOSPITAL ENCOUNTER (EMERGENCY)
Facility: HOSPITAL | Age: 34
Discharge: HOME | End: 2024-07-29
Attending: STUDENT IN AN ORGANIZED HEALTH CARE EDUCATION/TRAINING PROGRAM
Payer: COMMERCIAL

## 2024-07-29 ENCOUNTER — APPOINTMENT (OUTPATIENT)
Dept: CARDIOLOGY | Facility: HOSPITAL | Age: 34
End: 2024-07-29
Payer: COMMERCIAL

## 2024-07-29 VITALS
RESPIRATION RATE: 18 BRPM | DIASTOLIC BLOOD PRESSURE: 69 MMHG | OXYGEN SATURATION: 96 % | HEART RATE: 98 BPM | BODY MASS INDEX: 22.96 KG/M2 | TEMPERATURE: 97.9 F | HEIGHT: 69 IN | WEIGHT: 155 LBS | SYSTOLIC BLOOD PRESSURE: 127 MMHG

## 2024-07-29 DIAGNOSIS — F19.10 SUBSTANCE ABUSE (MULTI): Primary | ICD-10-CM

## 2024-07-29 DIAGNOSIS — I10 DIASTOLIC HYPERTENSION: ICD-10-CM

## 2024-07-29 DIAGNOSIS — F10.10 ALCOHOL ABUSE: ICD-10-CM

## 2024-07-29 DIAGNOSIS — E87.8 ELECTROLYTE IMBALANCE: ICD-10-CM

## 2024-07-29 LAB
ALBUMIN SERPL-MCNC: 3.5 G/DL (ref 3.5–5)
ALP BLD-CCNC: 46 U/L (ref 35–125)
ALT SERPL-CCNC: 29 U/L (ref 5–40)
AMPHETAMINES UR QL SCN>1000 NG/ML: NEGATIVE
ANION GAP SERPL CALC-SCNC: 14 MMOL/L
APAP SERPL-MCNC: <5 UG/ML
APPEARANCE UR: ABNORMAL
AST SERPL-CCNC: 26 U/L (ref 5–40)
BACTERIA #/AREA URNS AUTO: ABNORMAL /HPF
BARBITURATES UR QL SCN>300 NG/ML: NEGATIVE
BASOPHILS # BLD AUTO: 0.04 X10*3/UL (ref 0–0.1)
BASOPHILS NFR BLD AUTO: 0.7 %
BENZODIAZ UR QL SCN>300 NG/ML: NEGATIVE
BILIRUB SERPL-MCNC: 0.3 MG/DL (ref 0.1–1.2)
BILIRUB UR STRIP.AUTO-MCNC: NEGATIVE MG/DL
BUN SERPL-MCNC: 17 MG/DL (ref 8–25)
BZE UR QL SCN>300 NG/ML: NEGATIVE
CALCIUM SERPL-MCNC: 7.5 MG/DL (ref 8.5–10.4)
CANNABINOIDS UR QL SCN>50 NG/ML: POSITIVE
CHLORIDE SERPL-SCNC: 110 MMOL/L (ref 97–107)
CO2 SERPL-SCNC: 19 MMOL/L (ref 24–31)
COLOR UR: YELLOW
CREAT SERPL-MCNC: 0.7 MG/DL (ref 0.4–1.6)
EGFRCR SERPLBLD CKD-EPI 2021: >90 ML/MIN/1.73M*2
EOSINOPHIL # BLD AUTO: 0.04 X10*3/UL (ref 0–0.7)
EOSINOPHIL NFR BLD AUTO: 0.7 %
ERYTHROCYTE [DISTWIDTH] IN BLOOD BY AUTOMATED COUNT: 11.8 % (ref 11.5–14.5)
ETHANOL SERPL-MCNC: 0.06 G/DL
ETHANOL SERPL-MCNC: 0.16 G/DL
FENTANYL+NORFENTANYL UR QL SCN: NEGATIVE
GLUCOSE BLD MANUAL STRIP-MCNC: 128 MG/DL (ref 74–99)
GLUCOSE SERPL-MCNC: 118 MG/DL (ref 65–99)
GLUCOSE UR STRIP.AUTO-MCNC: NORMAL MG/DL
HCT VFR BLD AUTO: 42.6 % (ref 41–52)
HGB BLD-MCNC: 14.4 G/DL (ref 13.5–17.5)
IMM GRANULOCYTES # BLD AUTO: 0.01 X10*3/UL (ref 0–0.7)
IMM GRANULOCYTES NFR BLD AUTO: 0.2 % (ref 0–0.9)
KETONES UR STRIP.AUTO-MCNC: NEGATIVE MG/DL
LEUKOCYTE ESTERASE UR QL STRIP.AUTO: NEGATIVE
LYMPHOCYTES # BLD AUTO: 2.22 X10*3/UL (ref 1.2–4.8)
LYMPHOCYTES NFR BLD AUTO: 36.4 %
MCH RBC QN AUTO: 31.1 PG (ref 26–34)
MCHC RBC AUTO-ENTMCNC: 33.8 G/DL (ref 32–36)
MCV RBC AUTO: 92 FL (ref 80–100)
METHADONE UR QL SCN>300 NG/ML: POSITIVE
MONOCYTES # BLD AUTO: 0.54 X10*3/UL (ref 0.1–1)
MONOCYTES NFR BLD AUTO: 8.9 %
MUCOUS THREADS #/AREA URNS AUTO: ABNORMAL /LPF
NEUTROPHILS # BLD AUTO: 3.25 X10*3/UL (ref 1.2–7.7)
NEUTROPHILS NFR BLD AUTO: 53.1 %
NITRITE UR QL STRIP.AUTO: NEGATIVE
NRBC BLD-RTO: 0 /100 WBCS (ref 0–0)
OPIATES UR QL SCN>300 NG/ML: NEGATIVE
OXYCODONE UR QL: NEGATIVE
PCP UR QL SCN>25 NG/ML: NEGATIVE
PH UR STRIP.AUTO: 6 [PH]
PLATELET # BLD AUTO: 216 X10*3/UL (ref 150–450)
POTASSIUM SERPL-SCNC: 2.9 MMOL/L (ref 3.4–5.1)
POTASSIUM SERPL-SCNC: 4.7 MMOL/L (ref 3.4–5.1)
PROT SERPL-MCNC: 5.8 G/DL (ref 5.9–7.9)
PROT UR STRIP.AUTO-MCNC: ABNORMAL MG/DL
RBC # BLD AUTO: 4.63 X10*6/UL (ref 4.5–5.9)
RBC # UR STRIP.AUTO: NEGATIVE /UL
RBC #/AREA URNS AUTO: ABNORMAL /HPF
SALICYLATES SERPL-MCNC: <0 MG/DL
SODIUM SERPL-SCNC: 143 MMOL/L (ref 133–145)
SP GR UR STRIP.AUTO: 1.03
UROBILINOGEN UR STRIP.AUTO-MCNC: NORMAL MG/DL
WBC # BLD AUTO: 6.1 X10*3/UL (ref 4.4–11.3)
WBC #/AREA URNS AUTO: ABNORMAL /HPF

## 2024-07-29 PROCEDURE — 2500000004 HC RX 250 GENERAL PHARMACY W/ HCPCS (ALT 636 FOR OP/ED): Performed by: STUDENT IN AN ORGANIZED HEALTH CARE EDUCATION/TRAINING PROGRAM

## 2024-07-29 PROCEDURE — 2500000001 HC RX 250 WO HCPCS SELF ADMINISTERED DRUGS (ALT 637 FOR MEDICARE OP): Performed by: EMERGENCY MEDICINE

## 2024-07-29 PROCEDURE — 96365 THER/PROPH/DIAG IV INF INIT: CPT

## 2024-07-29 PROCEDURE — 82077 ASSAY SPEC XCP UR&BREATH IA: CPT | Mod: 59 | Performed by: EMERGENCY MEDICINE

## 2024-07-29 PROCEDURE — 36415 COLL VENOUS BLD VENIPUNCTURE: CPT | Performed by: EMERGENCY MEDICINE

## 2024-07-29 PROCEDURE — 82947 ASSAY GLUCOSE BLOOD QUANT: CPT

## 2024-07-29 PROCEDURE — 80307 DRUG TEST PRSMV CHEM ANLYZR: CPT | Performed by: STUDENT IN AN ORGANIZED HEALTH CARE EDUCATION/TRAINING PROGRAM

## 2024-07-29 PROCEDURE — 84132 ASSAY OF SERUM POTASSIUM: CPT | Performed by: EMERGENCY MEDICINE

## 2024-07-29 PROCEDURE — 96361 HYDRATE IV INFUSION ADD-ON: CPT

## 2024-07-29 PROCEDURE — 80053 COMPREHEN METABOLIC PANEL: CPT | Performed by: STUDENT IN AN ORGANIZED HEALTH CARE EDUCATION/TRAINING PROGRAM

## 2024-07-29 PROCEDURE — 36415 COLL VENOUS BLD VENIPUNCTURE: CPT | Performed by: STUDENT IN AN ORGANIZED HEALTH CARE EDUCATION/TRAINING PROGRAM

## 2024-07-29 PROCEDURE — 85025 COMPLETE CBC W/AUTO DIFF WBC: CPT | Performed by: STUDENT IN AN ORGANIZED HEALTH CARE EDUCATION/TRAINING PROGRAM

## 2024-07-29 PROCEDURE — 93005 ELECTROCARDIOGRAM TRACING: CPT

## 2024-07-29 PROCEDURE — 90839 PSYTX CRISIS INITIAL 60 MIN: CPT

## 2024-07-29 PROCEDURE — 96366 THER/PROPH/DIAG IV INF ADDON: CPT

## 2024-07-29 PROCEDURE — 99284 EMERGENCY DEPT VISIT MOD MDM: CPT | Mod: 25

## 2024-07-29 PROCEDURE — 81001 URINALYSIS AUTO W/SCOPE: CPT | Mod: 59 | Performed by: STUDENT IN AN ORGANIZED HEALTH CARE EDUCATION/TRAINING PROGRAM

## 2024-07-29 PROCEDURE — 80320 DRUG SCREEN QUANTALCOHOLS: CPT | Performed by: STUDENT IN AN ORGANIZED HEALTH CARE EDUCATION/TRAINING PROGRAM

## 2024-07-29 RX ORDER — POTASSIUM CHLORIDE 1.5 G/1.58G
40 POWDER, FOR SOLUTION ORAL ONCE
Status: COMPLETED | OUTPATIENT
Start: 2024-07-29 | End: 2024-07-29

## 2024-07-29 RX ORDER — POTASSIUM CHLORIDE 14.9 MG/ML
20 INJECTION INTRAVENOUS
Status: COMPLETED | OUTPATIENT
Start: 2024-07-29 | End: 2024-07-29

## 2024-07-29 RX ADMIN — POTASSIUM CHLORIDE 20 MEQ: 14.9 INJECTION, SOLUTION INTRAVENOUS at 03:55

## 2024-07-29 RX ADMIN — SODIUM CHLORIDE 1000 ML: 9 INJECTION, SOLUTION INTRAVENOUS at 01:45

## 2024-07-29 RX ADMIN — POTASSIUM CHLORIDE 20 MEQ: 14.9 INJECTION, SOLUTION INTRAVENOUS at 06:04

## 2024-07-29 RX ADMIN — POTASSIUM CHLORIDE 40 MEQ: 1.5 POWDER, FOR SOLUTION ORAL at 07:17

## 2024-07-29 SDOH — HEALTH STABILITY: MENTAL HEALTH: IN THE PAST WEEK, HAVE YOU BEEN HAVING THOUGHTS ABOUT KILLING YOURSELF?: NO

## 2024-07-29 SDOH — HEALTH STABILITY: MENTAL HEALTH: DEPRESSION SYMPTOMS: NO PROBLEMS REPORTED OR OBSERVED.

## 2024-07-29 SDOH — HEALTH STABILITY: MENTAL HEALTH: IN THE PAST FEW WEEKS, HAVE YOU WISHED YOU WERE DEAD?: NO

## 2024-07-29 SDOH — HEALTH STABILITY: MENTAL HEALTH: ANXIETY SYMPTOMS: NO PROBLEMS REPORTED OR OBSERVED.

## 2024-07-29 SDOH — HEALTH STABILITY: MENTAL HEALTH: HAVE YOU EVER TRIED TO KILL YOURSELF?: YES

## 2024-07-29 SDOH — HEALTH STABILITY: MENTAL HEALTH: HOW DID YOU TRY TO KILL YOURSELF?: OVERDOSE

## 2024-07-29 SDOH — HEALTH STABILITY: MENTAL HEALTH: ACTIVE SUICIDAL IDEATION WITH SPECIFIC PLAN AND INTENT (PAST 1 MONTH): NO

## 2024-07-29 SDOH — ECONOMIC STABILITY: GENERAL

## 2024-07-29 SDOH — HEALTH STABILITY: MENTAL HEALTH: IN THE PAST FEW WEEKS, HAVE YOU FELT THAT YOU OR YOUR FAMILY WOULD BE BETTER OFF IF YOU WERE DEAD?: NO

## 2024-07-29 SDOH — ECONOMIC STABILITY: HOUSING INSECURITY: FEELS SAFE LIVING IN HOME: YES

## 2024-07-29 SDOH — HEALTH STABILITY: MENTAL HEALTH: WISH TO BE DEAD (PAST 1 MONTH): NO

## 2024-07-29 SDOH — HEALTH STABILITY: MENTAL HEALTH: ACTIVE SUICIDAL IDEATION WITH SOME INTENT TO ACT, WITHOUT SPECIFIC PLAN (PAST 1 MONTH): NO

## 2024-07-29 SDOH — HEALTH STABILITY: MENTAL HEALTH

## 2024-07-29 SDOH — HEALTH STABILITY: MENTAL HEALTH: ARE YOU HAVING THOUGHTS OF KILLING YOURSELF RIGHT NOW?: NO

## 2024-07-29 SDOH — HEALTH STABILITY: MENTAL HEALTH: NON-SPECIFIC ACTIVE SUICIDAL THOUGHTS (PAST 1 MONTH): NO

## 2024-07-29 SDOH — HEALTH STABILITY: MENTAL HEALTH: SUICIDAL BEHAVIOR (LIFETIME): NO

## 2024-07-29 ASSESSMENT — COLUMBIA-SUICIDE SEVERITY RATING SCALE - C-SSRS
1. IN THE PAST MONTH, HAVE YOU WISHED YOU WERE DEAD OR WISHED YOU COULD GO TO SLEEP AND NOT WAKE UP?: NO
5. HAVE YOU STARTED TO WORK OUT OR WORKED OUT THE DETAILS OF HOW TO KILL YOURSELF? DO YOU INTEND TO CARRY OUT THIS PLAN?: NO
4. HAVE YOU HAD THESE THOUGHTS AND HAD SOME INTENTION OF ACTING ON THEM?: NO
2. HAVE YOU ACTUALLY HAD ANY THOUGHTS OF KILLING YOURSELF?: NO

## 2024-07-29 ASSESSMENT — LIFESTYLE VARIABLES
PRESCIPTION_ABUSE_PAST_12_MONTHS: NO
SUBSTANCE_ABUSE_PAST_12_MONTHS: YES

## 2024-07-29 ASSESSMENT — PAIN - FUNCTIONAL ASSESSMENT: PAIN_FUNCTIONAL_ASSESSMENT: 0-10

## 2024-07-29 ASSESSMENT — PAIN SCALES - GENERAL
PAINLEVEL_OUTOF10: 0 - NO PAIN
PAINLEVEL_OUTOF10: 0 - NO PAIN

## 2024-07-29 NOTE — ED NOTES
Spoke with poison control, they recommended watch until 0900 and use supportive care.  MD notified.      Nasir Ariza, RN  07/29/24 0309

## 2024-07-29 NOTE — PROGRESS NOTES
EPAT - Social Work Psychiatric Assessment    Arrival Details  Mode of Arrival: Ambulatory  Admission Source: Home  Admission Type: Voluntary  EPAT Assessment Start Date: 07/29/24  EPAT Assessment Start Time: 1000  Name of : Gregory FARIA    History of Present Illness  Admission Reason: Psychiatric Evaluation  HPI: Pt is a   presents to [facility] ED with complaint of Alcohol intoxication and alledged overdose on Seroquel.    reviewed the patient's chart and medical records which indicate a history of  Alcohol Use Disorder, MDD, ADHD. Pt is connected to [facility] for psychiatry. Pt denies SI, HI, AH, VH. Pt reports he took two seroquel in an attempt to get some rest after fighting with SO. PT had an ETOH bal of .139 upon arrival.  The triage risk assessment was reviewed and the patient was indicated to be  low  risk during triage. EPAT consulted for eval    SW Readmission Information   Readmission within 30 Days: No    Psychiatric Symptoms  Anxiety Symptoms: No problems reported or observed.  Depression Symptoms: No problems reported or observed.  Suzette Symptoms: No problems reported or observed.    Psychosis Symptoms  Hallucination Type: No problems reported or observed.  Delusion Type: No problems reported or observed.    Additional Symptoms - Adult  Generalized Anxiety Disorder: No problems reported or observed.  Obsessive Compulsive Disorder: No problems reported or observed.  Panic Attack: No problems reported or observed.  Post Traumatic Stress Disorder: No problems reported or observed.  Delirium: No problems reported or observed.    Past Psychiatric History/Meds/Treatments  Past Psychiatric History: Pt has a hx dx of MDD, ADHD, Alcohol Use Disorder.  Past Psychiatric Meds/Treatments: Pt is connected to [facility] for psychiatry.  Past Violence/Victimization History: none    Current Mental Health Contacts   Name/Phone Number: na   Last Appointment  Date: na  Provider Name/Phone Number: Masood  Provider Last Appointment Date: carol    Support System: Immediate family (Pts mom is supportive.)    Living Arrangement: Apartment, Lives alone    Home Safety  Feels Safe Living in Home: Yes    Income Information  Employment Status for: Patient  Employment Status: Disabled  Income Source: Disability  Financial Concerns: None    [place] Service/Education History  Current or Previous  Service: None  Education Level: High school  History of Learning Problems: No  History of School Behavior Problems: No    Social/Cultural History  Social History: Pt is a 33 yo  male that present to the [facility] ED calm and coopeative.  Cultural Requests During Hospitalization: na  Spiritual Requests During Hospitalization: na  Important Activities: Hobbies    Legal  Legal Considerations: Patient/ Family Ability to Make Healthcare Decisions  Legal Concerns: none  Legal Comments: none    Drug Screening  Have you used any substances (canabis, cocaine, heroin, hallucinogens, inhalants, etc.) in the past 12 months?: Yes (THC, Methadone, ETOH.)  Have you used any prescription drugs other than prescribed in the past 12 months?: No  Is a toxicology screen needed?: Yes    Stage of Change  Stage of Change: Precontemplation  History of Treatment: Inpatient, Dual, AA/NA meetings, IOP  Type of Treatment Offered: AA/NA meeting resource  Treatment Offered: Accepted, Resources/education provided (Pt provided with safety discharge plan, AA meeting list. Follow up with Masood.)  Duration of Substance Use: 15 years  Frequency of Substance Use: Daily  Age of First Substance Use: 17    Psychosocial  Psychosocial (WDL): Within Defined Limits    Orientation  Orientation Level: Oriented X4, Appropriate for developmental age    General Appearance  Motor Activity: Mannerisms  Speech Pattern: Excessively soft  General Attitude: Cooperative, Interested, Pleasant  Appearance/Hygiene:  Improved    Thought Process  Coherency: Gormania thinking  Content: Blaming self, Blaming others  Perception: Not altered  Hallucination: None  Judgment/Insight: Other (Comment)  Confusion: None  Cognition: Follows commands    Sleep Pattern  Sleep Pattern: Insomnia    Risk Factors  Self Harm/Suicidal Ideation Plan: none  Previous Self Harm/Suicidal Plans: 2 years ago suicide attempt via overdose.  Risk Factors: Major mental illness, Male, Substance abuse  Description of Thoughts/Ideas Leaving Unit Now: Pt agreeable to safety discharge plan.    Violence Risk Assessment  Assessment of Violence: None noted  Thoughts of Harm to Others: No    Ability to Assess Risk Screen  Risk Screen - Ability to Assess: Able to be screened  Ask Suicide-Screening Questions  1. In the past few weeks, have you wished you were dead?: No  2. In the past few weeks, have you felt that you or your family would be better off if you were dead?: No  3. In the past week, have you been having thoughts about killing yourself?: No  4. Have you ever tried to kill yourself?: Yes  How did you try to kill yourself?: overdose  When did you try to kill yourself?: 2 years ago  5. Are you having thoughts of killing yourself right now?: No  Calculated Risk Score: Potential Risk  [place] Suicide Severity Rating Scale (Screener/Recent Self-Report)  1. Wish to be Dead (Past 1 Month): No  2. Non-Specific Active Suicidal Thoughts (Past 1 Month): No  3. Active Suicidal Ideation with any Methods (Not Plan) Without Intent to Act (Past 1 Month): No  4. Active Suicidal Ideation with Some Intent to Act, Without Specific Plan (Past 1 Month): No  5. Active Suicidal Ideation with Specific Plan and Intent (Past 1 Month): No  6. Suicidal Behavior (Lifetime): No  Calculated C-SSRS Risk Score (Lifetime/Recent): No Risk Indicated  Step 1: Risk Factors  Current & Past Psychiatric Dx: Mood disorder, Alcohol/substance abuse disorders, ADHD  Presenting Symptoms: Insomia  Family  History: Axis I psychiatric diagnosis requiring hospitalization  Precipitants/Stressors: Substance intoxication or withdrawal, Triggering events leading to humiliation, shame, and/or despair (e.g. loss of relationship, financial or health status) (real or anticipated)  Change in Treatment: Hopeless or dissatisfied with provider or treatment  Access to Lethal Methods : No  Step 2: Protective Factors   Protective Factors Internal: Identifies reasons for living  Protective Factors External: Supportive social network or family or friends  Step 3: Suicidal Ideation Intensity  Most Severe Suicidal Ideation Identified: none  Step 5: Documentation  Risk Level: Low suicide risk    Psychiatric Impression and Plan of Care  Assessment and Plan: Upon assessment, Pt presents calm and cooperative during assessment. Pt arrived with an ETOH BAl of .159 and was assessed once clinically sober. Pt reports that he had an argument with his SO last night while they were drinking. SO called PD reporting pt had overdosed on Seroquel. Pt reports he took two of the Seroquel tablets so he could go to bed, denying SI, SA. SW spoke with pts mom who confirms that she has not concerns for pt and believes the new SO is upset that he asked her to move out.Pt was not pink slipped and does not meet criteria for inpatient admission. Pt does admit he needs to start going back to AA to stop drinking. Pt is connected to [facility] for psychiatry and will follow up with them along with  following the safety dischargeplan.  Specific Resources Provided to Patient: Peer support services not available at this location  CM Notified: yes  PHP/IOP Recommended: yes  Specific Information Provided for PHP/IOP: [facility] dual program.  Plan Comments: Safety Discharge plan    Outcome/Disposition  Patient's Perception of Outcome Achieved: Pt agreeable.  Assessment, Recommendations and Risk Level Reviewed with: Dr Hernandez  Contact Name:  Babs  Contact Number(s): [phone]  Contact Relationship: Mother  EPAT Assessment Completed Date: 07/29/24  EPAT Assessment Completed Time: 1020  Patient Disposition: Home

## 2024-07-29 NOTE — PROGRESS NOTES
Pt care accepted from Dr Multani at 0555 with the results of the UA/UTOX screen and the crisis evaluation pending        The patient is a 34-year-old male presenting to the emergency department for evaluation of alcohol intoxication and overdose on Seroquel.  EMS reports that the patient's family called them to asked them to transport him to the emergency department because he reportedly took 2 Seroquel tablets to help him to sleep after he was drinking heavily.  The patient reportedly does have a history of frequent alcohol abuse.  He states that normally he only drinks several beers per day but last night he did drink several beers in addition to other alcoholic beverages.  He was arguing with his ex earlier in the day.  He denies any homicidal or suicidal ideation.  He denies any hallucinations.  He states he just needed his own space and he took the Seroquel to help him sleep.  He denies any headache or visual changes.  No chest pain or shortness of breath.  No abdominal pain.  No nausea vomiting.  No diarrhea or constipation but no urinary complaints.  No fever or chills.  No cough or congestion.  All pertinent positives and negatives are recorded above.  All other systems reviewed and otherwise negative.  Vital signs within normal limits.  Physical exam with a well-nourished well-developed male with disheveled appearance but no evidence of acute distress.  HEENT exam within normal limits.  He has no evidence of airway compromise or respiratory distress.  Abdominal exam is benign.  He does not have any gross motor, neurologic or vascular deficits on exam at this time.      EKG with sinus tachycardia at 133 bpm, normal axis, normal voltage, normal ST segment, normal T waves      IV fluids ordered by the initial emergency room provider and the patient had resolution of his tachycardia.      Diagnostic labs with evidence of alcohol intoxication, electrolyte imbalance with hypokalemia and evidence of  polysubstance abuse      IV potassium chloride ordered by the initial emergency room provider and the repeat measurement of his potassium showed normalization of it.      Crisis intervention was consulted and evaluated the patient the emergency department.  They recommended discharge to home.  Did not feel that patient warranted inpatient mental health care placement at this time.  They did not feel that he was a risk to himself or anyone else.  He denies any homicidal or suicidal ideation.  They reportedly did obtain collateral circulation indicating that he is not suicidal and that he was in an argument with his girlfriend.  He attempted to leave the situation but the girlfriend wanted to continue the argument and called police to say that he took the medication.  He declined placement for detox.      The patient was released in good condition.  He was instructed to follow-up with his primary care physician within 1 to 2 days for further management of his current symptoms and repeat check of his blood pressure.  He was also given resources for substance abuse and alcohol abuse counseling.  He will return to the emergency department sooner with worsening of symptoms or onset of new symptoms        Impression/diagnosis  Alcohol abuse/intoxication  Electrolyte imbalance with hypokalemia  Polysubstance abuse  Diastolic hypertension      I independently reviewed the results of the EKG and diagnostic labs

## 2024-07-29 NOTE — PROGRESS NOTES
06:10 SW went to ED to obtain info on consult. Per ED Charge PEGGY Best. Pt was brought in due to drinking and taking too many Seroquel. Per pt not an SA, family believes differntly. Per Nasir, pt has low potassium and will continue to be monitored until 09:00 (per poison control) for medical clearance. Pt BAL .159 at time of arrival. Pt was asleep at time SW went into ED for more info.

## 2024-07-29 NOTE — DISCHARGE INSTRUCTIONS
Follow-up with your primary care physician within 1 to 2 days for further management of your current symptoms and repeat check of your blood pressure.    Use the resources provided to you by the crisis team to obtain outpatient mental health care and substance abuse /alcohol abuse counseling      Return to the emergency department sooner with worsening of symptoms or onset of new symptoms

## 2024-07-29 NOTE — Clinical Note
Amos Small was seen and treated in our emergency department on 7/29/2024.  He may return to work on 07/30/2024.       If you have any questions or concerns, please don't hesitate to call.      Sharath Multani MD

## 2024-07-30 NOTE — ED PROVIDER NOTES
HPI   Chief Complaint   Patient presents with    Drug Overdose     Patient presents by EMS for ETOH and possible seroquel overdose. Per EMS family called several times this evening for patient. EMS would go to see and patient would not be transported. This last time EMS arrived patient was alert to pain only. Patient brought to ED. Patient arrived alert to verbal stimuli. Patient speech is garbled and not able to understand. Per EMS family states patient took 2 Seroquel to help him sleep. Bottle was for 30 pills 50mg each. Prescription was filled on 07/11/2024 and kirsty       Patient presents to ED by EMS with concern for SI attempt and reported EtOH use in Seroquel OD.  Patient is significantly stuporous and poor historian.  States he did consume alcohol and took 2 of his Seroquel because he wanted to sleep after having a fight with significant other.  Patient does not endorse any SI.  Present does not endorse any HI or AVH.  Patient does not endorse any significant symptoms although difficult to complete full assessment due to significant somnolence associated with EtOH intoxication.              Patient History   Past Medical History:   Diagnosis Date    ADD (attention deficit disorder)     Anxiety     Arthritis     Autism (HHS-HCC)     Coronary artery disease     Depression     Disease of thyroid gland     GERD (gastroesophageal reflux disease)     Hepatitis B     High cholesterol     Hypertension     Hypothyroidism     Sexual dysfunction      Past Surgical History:   Procedure Laterality Date    CARDIAC SURGERY  09/30/2013    Heart Surgery     Family History   Adopted: Yes   Problem Relation Name Age of Onset    No Known Problems Mother's Sister      Anxiety disorder Other      Depression Other       Social History     Tobacco Use    Smoking status: Never    Smokeless tobacco: Never   Substance Use Topics    Alcohol use: Not Currently    Drug use: Yes     Types: Marijuana       Physical Exam   ED Triage Vitals    Temperature Heart Rate Respirations BP   07/29/24 0110 07/29/24 0110 07/29/24 0111 07/29/24 0112   36.6 °C (97.9 °F) (!) 139 15 97/52      Pulse Ox Temp Source Heart Rate Source Patient Position   07/29/24 0110 07/29/24 0110 07/29/24 0110 07/29/24 0110   95 % Temporal Monitor Lying      BP Location FiO2 (%)     07/29/24 0110 --     Left arm        Physical Exam  Vitals and nursing note reviewed.   Constitutional:       General: He is not in acute distress.     Appearance: Normal appearance. He is normal weight. He is not ill-appearing.   HENT:      Head: Normocephalic and atraumatic.      Nose: Nose normal.      Mouth/Throat:      Mouth: Mucous membranes are moist.      Pharynx: Oropharynx is clear.   Eyes:      General: No scleral icterus.     Conjunctiva/sclera:      Right eye: Right conjunctiva is injected.      Left eye: Left conjunctiva is injected.      Pupils: Pupils are equal, round, and reactive to light.      Comments: Pupils 5 to 3 mm equal and symmetrically reactive, bilateral moderate conjunctival injection   Cardiovascular:      Rate and Rhythm: Normal rate and regular rhythm.   Pulmonary:      Effort: Pulmonary effort is normal.      Breath sounds: Normal breath sounds.   Abdominal:      General: Abdomen is flat.      Tenderness: There is no abdominal tenderness.   Musculoskeletal:      Cervical back: Normal range of motion.   Skin:     General: Skin is warm and dry.   Neurological:      General: No focal deficit present.      Mental Status: He is alert.      Cranial Nerves: Cranial nerves 2-12 are intact.      Sensory: Sensation is intact.      Motor: Motor function is intact.      Comments: Gross motor/strength/sensation intact x 4 with purposeful movement x 4, significantly intoxicated and unable to demonstrate appropriate orientation or significant insight into current medical/health circumstances           ED Course & MDM   ED Course as of 08/01/24 1421   Mon Jul 29, 2024   0116 VS notable for  tachycardia and mild hypotension in the setting of EtOH use/dependency and unintentional Seroquel OD, remaining VSS [BC]   0116 I personally reviewed and interpreted the EKG @0110: Sinus tach 133, no appreciable ischemia, LAD, LAFB, prolonged Qtc 461, and prior EKG reviewed without any appreciable specific/identifiable changes. [BC]   0240 CBC with Differential  unremarkable and noncontributory to Patient condition/symptoms   [BC]   0241 POCT GLUCOSE(!)  No concern for significant metabolic derangement [BC]   0243 Acute Toxicology Panel, Blood(!)  Mild EtOH use in the setting of potential SI attempt with OD of Seroquel, AMS [BC]   0243 Comprehensive Metabolic Panel(!)  Moderate hypocarbia likely due to poor nutritional/fluid intake, otherwise unremarkable and noncontributory to Patient condition/symptoms   [BC]   0600 Drug Screen, Urine(!)  Cannabinoid and methadone metabolites in the setting of medical and psychiatric evaluation [BC]      ED Course User Index  [BC] Sharath Multani MD         Diagnoses as of 08/01/24 1421   Substance abuse (Multi)   Alcohol abuse   Electrolyte imbalance   Diastolic hypertension                       No data recorded                        Medical Decision Making  Patient presented to the ED for medical evaluation for intoxication and concern for SI with reported EtOH use and taking Seroquel of unknown amount with concerning PMHx of EtOH use/dependency,.  I personally reviewed and interpreted VS, labs, and EKG which are as stated above in the ED course.    Assessment/evaluation polysubstance induced mood/behavior disorder complicated by significant somnolence associated with polysubstance use (alcohol intoxication and prescribed Seroquel), incidentally identified to be moderately hypokalemic likely associated with poor nutritional p.o. intake.  No concerning history, clinical evidence/work-up, or exam findings for the concerning differentials of SI/HI, AVH, acute psychiatric  "decompensation, significant metabolic derangement, traumatic encephalopathy, infectious processes/encephalopathy.  These conditions have been thoroughly evaluated and determined to be sufficiently unlikely to be the etiology of patient's presenting symptoms.      Patient signed out to oncoming provider, Dr. Octavia Hernandez, at 6:10 AM in stable condition.    /69   Pulse 98   Temp 36.6 °C (97.9 °F) (Temporal)   Resp 18   Ht 1.753 m (5' 9\")   Wt 70.3 kg (155 lb)   SpO2 96%   BMI 22.89 kg/m²     Remaining workup:  Reassessment and Sober re-eval    Patient disposition Discharge Home and alternative disposition Consider EPAT Recs for potential Psychiatric Admission.      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      Parts of this chart have been completed using voice recognition software. Please excuse any errors of transcription.    Problems Addressed:  Alcohol abuse: chronic illness or injury  Electrolyte imbalance: acute illness or injury  Substance abuse (Multi): chronic illness or injury    Amount and/or Complexity of Data Reviewed  External Data Reviewed: notes.  Labs: ordered. Decision-making details documented in ED Course.  ECG/medicine tests: ordered and independent interpretation performed. Decision-making details documented in ED Course.        Procedure  Procedures     Sharath Multani MD  08/01/24 1421    "

## 2024-08-01 LAB
ATRIAL RATE: 133 BPM
P AXIS: 67 DEGREES
P OFFSET: 209 MS
P ONSET: 159 MS
PR INTERVAL: 122 MS
Q ONSET: 220 MS
QRS COUNT: 21 BEATS
QRS DURATION: 96 MS
QT INTERVAL: 310 MS
QTC CALCULATION(BAZETT): 461 MS
QTC FREDERICIA: 404 MS
R AXIS: -53 DEGREES
T AXIS: 64 DEGREES
T OFFSET: 375 MS
VENTRICULAR RATE: 133 BPM

## 2024-08-14 ENCOUNTER — APPOINTMENT (OUTPATIENT)
Dept: PRIMARY CARE | Facility: CLINIC | Age: 34
End: 2024-08-14
Payer: COMMERCIAL

## 2024-08-23 ENCOUNTER — APPOINTMENT (OUTPATIENT)
Dept: PRIMARY CARE | Facility: CLINIC | Age: 34
End: 2024-08-23
Payer: COMMERCIAL

## 2024-08-23 PROBLEM — R42 DIZZINESS AND GIDDINESS: Status: ACTIVE | Noted: 2022-11-17

## 2024-08-23 PROBLEM — F33.1 MODERATE EPISODE OF RECURRENT MAJOR DEPRESSIVE DISORDER (MULTI): Chronic | Status: ACTIVE | Noted: 2024-01-19

## 2024-08-23 PROBLEM — E03.4 ACQUIRED ATROPHY OF THYROID: Status: ACTIVE | Noted: 2023-07-10

## 2024-08-23 PROBLEM — F10.10 ETOH ABUSE: Status: ACTIVE | Noted: 2024-01-19

## 2024-08-23 PROBLEM — E55.9 VITAMIN D DEFICIENCY: Status: ACTIVE | Noted: 2023-07-11

## 2024-08-23 PROBLEM — K64.9 HEMORRHOIDS: Status: ACTIVE | Noted: 2024-08-23

## 2024-08-23 PROBLEM — Z20.822 CONTACT WITH AND (SUSPECTED) EXPOSURE TO COVID-19: Status: ACTIVE | Noted: 2022-11-17

## 2024-08-23 PROBLEM — N52.9 ERECTILE DYSFUNCTION: Status: ACTIVE | Noted: 2023-11-01

## 2024-08-23 PROBLEM — J45.909 ASTHMA (HHS-HCC): Status: ACTIVE | Noted: 2023-12-12

## 2024-08-23 PROBLEM — M15.9 OSTEOARTHRITIS OF MULTIPLE JOINTS: Status: ACTIVE | Noted: 2023-04-06

## 2024-08-23 PROBLEM — E78.00 HYPERCHOLESTEROLEMIA: Status: ACTIVE | Noted: 2023-10-16

## 2024-08-23 PROBLEM — M22.2X9 PATELLOFEMORAL PAIN SYNDROME: Status: ACTIVE | Noted: 2024-08-23

## 2024-08-30 ENCOUNTER — APPOINTMENT (OUTPATIENT)
Dept: PRIMARY CARE | Facility: CLINIC | Age: 34
End: 2024-08-30
Payer: COMMERCIAL